# Patient Record
Sex: MALE | Race: WHITE | Employment: OTHER | URBAN - METROPOLITAN AREA
[De-identification: names, ages, dates, MRNs, and addresses within clinical notes are randomized per-mention and may not be internally consistent; named-entity substitution may affect disease eponyms.]

---

## 2018-06-26 ENCOUNTER — APPOINTMENT (OUTPATIENT)
Dept: GENERAL RADIOLOGY | Age: 83
End: 2018-06-26
Payer: MEDICARE

## 2018-06-26 ENCOUNTER — HOSPITAL ENCOUNTER (EMERGENCY)
Age: 83
Discharge: HOME | End: 2018-06-26
Attending: EMERGENCY MEDICINE
Payer: MEDICARE

## 2018-06-26 VITALS
TEMPERATURE: 97.8 F | BODY MASS INDEX: 29.35 KG/M2 | RESPIRATION RATE: 14 BRPM | HEIGHT: 70 IN | DIASTOLIC BLOOD PRESSURE: 84 MMHG | SYSTOLIC BLOOD PRESSURE: 150 MMHG | HEART RATE: 68 BPM | WEIGHT: 205 LBS | OXYGEN SATURATION: 96 %

## 2018-06-26 DIAGNOSIS — S42.001A CLOSED NONDISPLACED FRACTURE OF RIGHT CLAVICLE, UNSPECIFIED PART OF CLAVICLE, INITIAL ENCOUNTER: Primary | ICD-10-CM

## 2018-06-26 PROCEDURE — 73030 X-RAY EXAM OF SHOULDER: CPT

## 2018-06-26 PROCEDURE — 99284 EMERGENCY DEPT VISIT MOD MDM: CPT

## 2018-06-26 ASSESSMENT — PAIN DESCRIPTION - PAIN TYPE: TYPE: ACUTE PAIN

## 2018-06-26 ASSESSMENT — PAIN DESCRIPTION - DESCRIPTORS: DESCRIPTORS: ACHING

## 2018-06-26 ASSESSMENT — PAIN DESCRIPTION - LOCATION: LOCATION: SHOULDER

## 2018-06-26 ASSESSMENT — PAIN DESCRIPTION - FREQUENCY: FREQUENCY: CONTINUOUS

## 2018-06-26 ASSESSMENT — PAIN SCALES - GENERAL: PAINLEVEL_OUTOF10: 0

## 2018-06-26 ASSESSMENT — PAIN DESCRIPTION - ORIENTATION: ORIENTATION: RIGHT

## 2018-06-28 ENCOUNTER — OFFICE VISIT (OUTPATIENT)
Dept: ORTHOPEDIC SURGERY | Age: 83
End: 2018-06-28
Payer: MEDICARE

## 2018-06-28 VITALS
BODY MASS INDEX: 28.63 KG/M2 | HEIGHT: 70 IN | SYSTOLIC BLOOD PRESSURE: 134 MMHG | DIASTOLIC BLOOD PRESSURE: 81 MMHG | TEMPERATURE: 97 F | HEART RATE: 61 BPM | WEIGHT: 200 LBS

## 2018-06-28 DIAGNOSIS — S42.001A CLOSED NONDISPLACED FRACTURE OF RIGHT CLAVICLE, UNSPECIFIED PART OF CLAVICLE, INITIAL ENCOUNTER: Primary | ICD-10-CM

## 2018-06-28 PROCEDURE — 1036F TOBACCO NON-USER: CPT | Performed by: ORTHOPAEDIC SURGERY

## 2018-06-28 PROCEDURE — G8419 CALC BMI OUT NRM PARAM NOF/U: HCPCS | Performed by: ORTHOPAEDIC SURGERY

## 2018-06-28 PROCEDURE — 99203 OFFICE O/P NEW LOW 30 MIN: CPT | Performed by: ORTHOPAEDIC SURGERY

## 2018-06-28 PROCEDURE — 4040F PNEUMOC VAC/ADMIN/RCVD: CPT | Performed by: ORTHOPAEDIC SURGERY

## 2018-06-28 PROCEDURE — 23500 CLTX CLAVICULAR FX W/O MNPJ: CPT | Performed by: ORTHOPAEDIC SURGERY

## 2018-06-28 PROCEDURE — G8427 DOCREV CUR MEDS BY ELIG CLIN: HCPCS | Performed by: ORTHOPAEDIC SURGERY

## 2018-06-28 PROCEDURE — A4565 SLINGS: HCPCS | Performed by: ORTHOPAEDIC SURGERY

## 2018-06-28 PROCEDURE — G8598 ASA/ANTIPLAT THER USED: HCPCS | Performed by: ORTHOPAEDIC SURGERY

## 2018-06-28 PROCEDURE — 1123F ACP DISCUSS/DSCN MKR DOCD: CPT | Performed by: ORTHOPAEDIC SURGERY

## 2018-07-26 ENCOUNTER — OFFICE VISIT (OUTPATIENT)
Dept: ORTHOPEDIC SURGERY | Age: 83
End: 2018-07-26

## 2018-07-26 VITALS
WEIGHT: 200 LBS | TEMPERATURE: 97 F | HEIGHT: 70 IN | SYSTOLIC BLOOD PRESSURE: 136 MMHG | HEART RATE: 54 BPM | DIASTOLIC BLOOD PRESSURE: 81 MMHG | BODY MASS INDEX: 28.63 KG/M2

## 2018-07-26 DIAGNOSIS — S42.001D CLOSED NONDISPLACED FRACTURE OF RIGHT CLAVICLE WITH ROUTINE HEALING, UNSPECIFIED PART OF CLAVICLE, SUBSEQUENT ENCOUNTER: Primary | ICD-10-CM

## 2018-07-26 PROCEDURE — 99024 POSTOP FOLLOW-UP VISIT: CPT | Performed by: ORTHOPAEDIC SURGERY

## 2018-07-26 NOTE — PROGRESS NOTES
Smokeless tobacco: Never Used    Alcohol use No    Drug use: No    Sexual activity: Not Asked     Other Topics Concern    None     Social History Narrative    None       History reviewed. No pertinent family history. Review of Systems   No fever, chills, or other constitutional symptoms. No numbness or other neuro symptoms. Ortho Exam right shoulder exam demonstrates prominence of the right mid clavicle. Skin is intact without erythema. Anterior chest ecchymosis is resolving. Very mild tenderness over the right clavicular fracture site. Right shoulder flexion about 90°, abduction 90° gross strength is intact. Note that his left shoulder flexion is limited to about 110. Physical Exam    Patient is alert and oriented. Well-developed well-nourished. Pupils equal and reactive. Sclerae anicteric. Neck supple  Lungs clear. Cardiac rate and rhythm regular. Abdomen soft and nontender. Skin warm and dry. XRAY: deferred by patient request                    ASSESSMENT/PLAN:    Abbi Lozano was seen today for clavicle injury. Diagnoses and all orders for this visit:    Closed nondisplaced fracture of right clavicle with routine healing, unspecified part of clavicle, subsequent encounter     improving clinically 1 month after right clavicle fracture. Given his good progress and pain relief and his age, surgical treatment is not currently under consideration. Appropriate exercises were reviewed with the patient. He wishes to follow-up on an as-needed basis. Return if symptoms worsen or fail to improve.        Omar Eubanks MD    7/26/2018  10:16 AM

## 2018-10-04 ENCOUNTER — OFFICE VISIT (OUTPATIENT)
Dept: CARDIOLOGY CLINIC | Age: 83
End: 2018-10-04
Payer: MEDICARE

## 2018-10-04 VITALS
SYSTOLIC BLOOD PRESSURE: 110 MMHG | HEART RATE: 62 BPM | WEIGHT: 189 LBS | RESPIRATION RATE: 14 BRPM | BODY MASS INDEX: 27.99 KG/M2 | DIASTOLIC BLOOD PRESSURE: 70 MMHG | HEIGHT: 69 IN

## 2018-10-04 DIAGNOSIS — I24.9 ACS (ACUTE CORONARY SYNDROME) (HCC): Primary | ICD-10-CM

## 2018-10-04 PROCEDURE — 1101F PT FALLS ASSESS-DOCD LE1/YR: CPT | Performed by: INTERNAL MEDICINE

## 2018-10-04 PROCEDURE — 99213 OFFICE O/P EST LOW 20 MIN: CPT | Performed by: INTERNAL MEDICINE

## 2018-10-04 PROCEDURE — G8598 ASA/ANTIPLAT THER USED: HCPCS | Performed by: INTERNAL MEDICINE

## 2018-10-04 PROCEDURE — 1123F ACP DISCUSS/DSCN MKR DOCD: CPT | Performed by: INTERNAL MEDICINE

## 2018-10-04 PROCEDURE — G8484 FLU IMMUNIZE NO ADMIN: HCPCS | Performed by: INTERNAL MEDICINE

## 2018-10-04 PROCEDURE — G8419 CALC BMI OUT NRM PARAM NOF/U: HCPCS | Performed by: INTERNAL MEDICINE

## 2018-10-04 PROCEDURE — 93000 ELECTROCARDIOGRAM COMPLETE: CPT | Performed by: INTERNAL MEDICINE

## 2018-10-04 PROCEDURE — 1036F TOBACCO NON-USER: CPT | Performed by: INTERNAL MEDICINE

## 2018-10-04 PROCEDURE — G8427 DOCREV CUR MEDS BY ELIG CLIN: HCPCS | Performed by: INTERNAL MEDICINE

## 2018-10-04 PROCEDURE — 4040F PNEUMOC VAC/ADMIN/RCVD: CPT | Performed by: INTERNAL MEDICINE

## 2019-01-31 ENCOUNTER — APPOINTMENT (OUTPATIENT)
Dept: GENERAL RADIOLOGY | Age: 84
End: 2019-01-31
Payer: MEDICARE

## 2019-01-31 ENCOUNTER — HOSPITAL ENCOUNTER (EMERGENCY)
Age: 84
Discharge: HOME | End: 2019-01-31
Attending: EMERGENCY MEDICINE
Payer: MEDICARE

## 2019-01-31 VITALS
HEART RATE: 88 BPM | HEIGHT: 69 IN | DIASTOLIC BLOOD PRESSURE: 88 MMHG | WEIGHT: 198 LBS | SYSTOLIC BLOOD PRESSURE: 150 MMHG | OXYGEN SATURATION: 95 % | TEMPERATURE: 98.3 F | BODY MASS INDEX: 29.33 KG/M2 | RESPIRATION RATE: 16 BRPM

## 2019-01-31 DIAGNOSIS — R04.2 SPITTING UP BLOOD: Primary | ICD-10-CM

## 2019-01-31 LAB
ALBUMIN SERPL-MCNC: 4.6 G/DL (ref 3.5–5.2)
ALP BLD-CCNC: 66 U/L (ref 40–129)
ALT SERPL-CCNC: 21 U/L (ref 0–40)
ANION GAP SERPL CALCULATED.3IONS-SCNC: 14 MMOL/L (ref 7–16)
AST SERPL-CCNC: 30 U/L (ref 0–39)
BASOPHILS ABSOLUTE: 0.07 E9/L (ref 0–0.2)
BASOPHILS RELATIVE PERCENT: 1.3 % (ref 0–2)
BILIRUB SERPL-MCNC: 0.4 MG/DL (ref 0–1.2)
BUN BLDV-MCNC: 28 MG/DL (ref 8–23)
CALCIUM SERPL-MCNC: 10.3 MG/DL (ref 8.6–10.2)
CHLORIDE BLD-SCNC: 102 MMOL/L (ref 98–107)
CO2: 24 MMOL/L (ref 22–29)
CREAT SERPL-MCNC: 1.7 MG/DL (ref 0.7–1.2)
EOSINOPHILS ABSOLUTE: 0.2 E9/L (ref 0.05–0.5)
EOSINOPHILS RELATIVE PERCENT: 3.6 % (ref 0–6)
GFR AFRICAN AMERICAN: 47
GFR NON-AFRICAN AMERICAN: 39 ML/MIN/1.73
GLUCOSE BLD-MCNC: 212 MG/DL (ref 74–99)
HCT VFR BLD CALC: 38.1 % (ref 37–54)
HEMOGLOBIN: 12.7 G/DL (ref 12.5–16.5)
IMMATURE GRANULOCYTES #: 0.02 E9/L
IMMATURE GRANULOCYTES %: 0.4 % (ref 0–5)
LYMPHOCYTES ABSOLUTE: 1.8 E9/L (ref 1.5–4)
LYMPHOCYTES RELATIVE PERCENT: 32.5 % (ref 20–42)
MCH RBC QN AUTO: 32.8 PG (ref 26–35)
MCHC RBC AUTO-ENTMCNC: 33.3 % (ref 32–34.5)
MCV RBC AUTO: 98.4 FL (ref 80–99.9)
MONOCYTES ABSOLUTE: 0.54 E9/L (ref 0.1–0.95)
MONOCYTES RELATIVE PERCENT: 9.8 % (ref 2–12)
NEUTROPHILS ABSOLUTE: 2.9 E9/L (ref 1.8–7.3)
NEUTROPHILS RELATIVE PERCENT: 52.4 % (ref 43–80)
PDW BLD-RTO: 13.3 FL (ref 11.5–15)
PLATELET # BLD: 237 E9/L (ref 130–450)
PMV BLD AUTO: 11 FL (ref 7–12)
POTASSIUM SERPL-SCNC: 4.7 MMOL/L (ref 3.5–5)
RBC # BLD: 3.87 E12/L (ref 3.8–5.8)
SODIUM BLD-SCNC: 140 MMOL/L (ref 132–146)
TOTAL PROTEIN: 8 G/DL (ref 6.4–8.3)
WBC # BLD: 5.5 E9/L (ref 4.5–11.5)

## 2019-01-31 PROCEDURE — 85025 COMPLETE CBC W/AUTO DIFF WBC: CPT

## 2019-01-31 PROCEDURE — 80053 COMPREHEN METABOLIC PANEL: CPT

## 2019-01-31 PROCEDURE — 99283 EMERGENCY DEPT VISIT LOW MDM: CPT

## 2019-01-31 PROCEDURE — 36415 COLL VENOUS BLD VENIPUNCTURE: CPT

## 2019-01-31 PROCEDURE — 71046 X-RAY EXAM CHEST 2 VIEWS: CPT

## 2019-10-08 ENCOUNTER — OFFICE VISIT (OUTPATIENT)
Dept: CARDIOLOGY CLINIC | Age: 84
End: 2019-10-08
Payer: MEDICARE

## 2019-10-08 VITALS
BODY MASS INDEX: 28.71 KG/M2 | HEART RATE: 56 BPM | HEIGHT: 69 IN | WEIGHT: 193.8 LBS | SYSTOLIC BLOOD PRESSURE: 112 MMHG | DIASTOLIC BLOOD PRESSURE: 70 MMHG | RESPIRATION RATE: 18 BRPM

## 2019-10-08 DIAGNOSIS — I24.9 ACS (ACUTE CORONARY SYNDROME) (HCC): Primary | ICD-10-CM

## 2019-10-08 PROCEDURE — G8598 ASA/ANTIPLAT THER USED: HCPCS | Performed by: INTERNAL MEDICINE

## 2019-10-08 PROCEDURE — 4040F PNEUMOC VAC/ADMIN/RCVD: CPT | Performed by: INTERNAL MEDICINE

## 2019-10-08 PROCEDURE — G8484 FLU IMMUNIZE NO ADMIN: HCPCS | Performed by: INTERNAL MEDICINE

## 2019-10-08 PROCEDURE — G8427 DOCREV CUR MEDS BY ELIG CLIN: HCPCS | Performed by: INTERNAL MEDICINE

## 2019-10-08 PROCEDURE — 1123F ACP DISCUSS/DSCN MKR DOCD: CPT | Performed by: INTERNAL MEDICINE

## 2019-10-08 PROCEDURE — 99213 OFFICE O/P EST LOW 20 MIN: CPT | Performed by: INTERNAL MEDICINE

## 2019-10-08 PROCEDURE — 1036F TOBACCO NON-USER: CPT | Performed by: INTERNAL MEDICINE

## 2019-10-08 PROCEDURE — 93000 ELECTROCARDIOGRAM COMPLETE: CPT | Performed by: INTERNAL MEDICINE

## 2019-10-08 PROCEDURE — G8419 CALC BMI OUT NRM PARAM NOF/U: HCPCS | Performed by: INTERNAL MEDICINE

## 2020-12-15 LAB — SARS-COV-2: DETECTED

## 2020-12-16 ENCOUNTER — APPOINTMENT (OUTPATIENT)
Dept: GENERAL RADIOLOGY | Age: 85
DRG: 177 | End: 2020-12-16
Payer: MEDICARE

## 2020-12-16 ENCOUNTER — HOSPITAL ENCOUNTER (EMERGENCY)
Age: 85
Discharge: HOME | DRG: 177 | End: 2020-12-16
Attending: EMERGENCY MEDICINE
Payer: MEDICARE

## 2020-12-16 VITALS
RESPIRATION RATE: 18 BRPM | OXYGEN SATURATION: 98 % | DIASTOLIC BLOOD PRESSURE: 67 MMHG | SYSTOLIC BLOOD PRESSURE: 113 MMHG | WEIGHT: 190 LBS | TEMPERATURE: 98.6 F | BODY MASS INDEX: 27.2 KG/M2 | HEART RATE: 75 BPM | HEIGHT: 70 IN

## 2020-12-16 LAB
ALBUMIN SERPL-MCNC: 3.7 G/DL (ref 3.5–5.2)
ALP BLD-CCNC: 46 U/L (ref 40–129)
ALT SERPL-CCNC: 35 U/L (ref 0–40)
ANION GAP SERPL CALCULATED.3IONS-SCNC: 15 MMOL/L (ref 7–16)
AST SERPL-CCNC: 86 U/L (ref 0–39)
BASOPHILS ABSOLUTE: 0.01 E9/L (ref 0–0.2)
BASOPHILS RELATIVE PERCENT: 0.3 % (ref 0–2)
BILIRUB SERPL-MCNC: 0.8 MG/DL (ref 0–1.2)
BUN BLDV-MCNC: 27 MG/DL (ref 8–23)
CALCIUM SERPL-MCNC: 9.7 MG/DL (ref 8.6–10.2)
CHLORIDE BLD-SCNC: 97 MMOL/L (ref 98–107)
CO2: 20 MMOL/L (ref 22–29)
CREAT SERPL-MCNC: 1.8 MG/DL (ref 0.7–1.2)
EKG ATRIAL RATE: 72 BPM
EKG P AXIS: 10 DEGREES
EKG P-R INTERVAL: 160 MS
EKG Q-T INTERVAL: 416 MS
EKG QRS DURATION: 114 MS
EKG QTC CALCULATION (BAZETT): 455 MS
EKG R AXIS: -50 DEGREES
EKG T AXIS: 58 DEGREES
EKG VENTRICULAR RATE: 72 BPM
EOSINOPHILS ABSOLUTE: 0.03 E9/L (ref 0.05–0.5)
EOSINOPHILS RELATIVE PERCENT: 0.8 % (ref 0–6)
GFR AFRICAN AMERICAN: 44
GFR NON-AFRICAN AMERICAN: 36 ML/MIN/1.73
GLUCOSE BLD-MCNC: 102 MG/DL (ref 74–99)
HCT VFR BLD CALC: 35.5 % (ref 37–54)
HEMOGLOBIN: 12.4 G/DL (ref 12.5–16.5)
IMMATURE GRANULOCYTES #: 0.04 E9/L
IMMATURE GRANULOCYTES %: 1.1 % (ref 0–5)
LYMPHOCYTES ABSOLUTE: 0.91 E9/L (ref 1.5–4)
LYMPHOCYTES RELATIVE PERCENT: 25.5 % (ref 20–42)
MCH RBC QN AUTO: 33.8 PG (ref 26–35)
MCHC RBC AUTO-ENTMCNC: 34.9 % (ref 32–34.5)
MCV RBC AUTO: 96.7 FL (ref 80–99.9)
MONOCYTES ABSOLUTE: 0.44 E9/L (ref 0.1–0.95)
MONOCYTES RELATIVE PERCENT: 12.3 % (ref 2–12)
NEUTROPHILS ABSOLUTE: 2.14 E9/L (ref 1.8–7.3)
NEUTROPHILS RELATIVE PERCENT: 60 % (ref 43–80)
PDW BLD-RTO: 13 FL (ref 11.5–15)
PLATELET # BLD: 194 E9/L (ref 130–450)
PMV BLD AUTO: 10.3 FL (ref 7–12)
POTASSIUM SERPL-SCNC: 4.6 MMOL/L (ref 3.5–5)
PRO-BNP: 474 PG/ML (ref 0–450)
RBC # BLD: 3.67 E12/L (ref 3.8–5.8)
SODIUM BLD-SCNC: 132 MMOL/L (ref 132–146)
TOTAL PROTEIN: 7.7 G/DL (ref 6.4–8.3)
TROPONIN: 0.01 NG/ML (ref 0–0.03)
WBC # BLD: 3.6 E9/L (ref 4.5–11.5)

## 2020-12-16 PROCEDURE — 84484 ASSAY OF TROPONIN QUANT: CPT

## 2020-12-16 PROCEDURE — 85025 COMPLETE CBC W/AUTO DIFF WBC: CPT

## 2020-12-16 PROCEDURE — 80053 COMPREHEN METABOLIC PANEL: CPT

## 2020-12-16 PROCEDURE — 99284 EMERGENCY DEPT VISIT MOD MDM: CPT

## 2020-12-16 PROCEDURE — 36415 COLL VENOUS BLD VENIPUNCTURE: CPT

## 2020-12-16 PROCEDURE — 93005 ELECTROCARDIOGRAM TRACING: CPT | Performed by: EMERGENCY MEDICINE

## 2020-12-16 PROCEDURE — 83880 ASSAY OF NATRIURETIC PEPTIDE: CPT

## 2020-12-16 PROCEDURE — 71045 X-RAY EXAM CHEST 1 VIEW: CPT

## 2020-12-16 PROCEDURE — 93010 ELECTROCARDIOGRAM REPORT: CPT | Performed by: INTERNAL MEDICINE

## 2020-12-16 RX ORDER — AZITHROMYCIN 250 MG/1
TABLET, FILM COATED ORAL
Qty: 1 PACKET | Refills: 0 | Status: SHIPPED | OUTPATIENT
Start: 2020-12-16 | End: 2020-12-19

## 2020-12-16 RX ORDER — AMOXICILLIN AND CLAVULANATE POTASSIUM 875; 125 MG/1; MG/1
1 TABLET, FILM COATED ORAL 2 TIMES DAILY
Qty: 14 TABLET | Refills: 0 | Status: SHIPPED | OUTPATIENT
Start: 2020-12-16 | End: 2020-12-19

## 2020-12-16 ASSESSMENT — ENCOUNTER SYMPTOMS
EYE DISCHARGE: 0
EYE PAIN: 0
VOMITING: 0
BACK PAIN: 0
ABDOMINAL PAIN: 0
EYE REDNESS: 0
WHEEZING: 0
SINUS PRESSURE: 0
NAUSEA: 0
COUGH: 1
SORE THROAT: 0
DIARRHEA: 1
SHORTNESS OF BREATH: 1

## 2020-12-16 NOTE — ED NOTES
Patient ambulated with pulse ox. Patients pulse ox 92-94% RA.  Dr Lizabeth Cooks notified     Javier Patel RN  12/16/20 2629

## 2020-12-16 NOTE — ED PROVIDER NOTES
Hypertension, Sleep apnea, and Type II or unspecified type diabetes mellitus without mention of complication, not stated as uncontrolled. Past Surgical History:  has a past surgical history that includes Coronary artery bypass graft (1998); Total knee arthroplasty (2000); and Cholecystectomy. Social History:  reports that he quit smoking about 57 years ago. His smoking use included cigarettes. He has a 10.00 pack-year smoking history. He has never used smokeless tobacco. He reports that he does not drink alcohol or use drugs. Family History: family history is not on file. The patients home medications have been reviewed. Allergies: Bee venom, Shellfish-derived products, and Sulfa antibiotics        ------------------------- NURSING NOTES AND VITALS REVIEWED ---------------------------   The nursing notes within the ED encounter and vital signs as below have been reviewed. /67   Pulse 75   Temp 98.6 °F (37 °C) (Oral)   Resp 18   Ht 5' 10\" (1.778 m)   Wt 190 lb (86.2 kg)   SpO2 98%   BMI 27.26 kg/m²   Oxygen Saturation Interpretation: Normal      ---------------------------------------------------PHYSICAL EXAM--------------------------------------    Physical Exam  Vitals signs and nursing note reviewed. Constitutional:       Appearance: He is well-developed. HENT:      Head: Normocephalic and atraumatic. Eyes:      Conjunctiva/sclera: Conjunctivae normal.   Neck:      Musculoskeletal: Normal range of motion and neck supple. Cardiovascular:      Rate and Rhythm: Normal rate and regular rhythm. Pulses: Normal pulses. Heart sounds: Normal heart sounds. No murmur. Comments: Old well-healed median sternotomy scar  Pulmonary:      Effort: Pulmonary effort is normal. No respiratory distress. Breath sounds: Normal breath sounds. No wheezing or rales. Abdominal:      General: Bowel sounds are normal.      Palpations: Abdomen is soft. Tenderness:  There is no abdominal tenderness. There is no guarding or rebound. Musculoskeletal:         General: No tenderness or deformity. Skin:     General: Skin is warm and dry. Capillary Refill: Capillary refill takes less than 2 seconds. Neurological:      Mental Status: He is alert and oriented to person, place, and time. Cranial Nerves: No cranial nerve deficit.       Coordination: Coordination normal.            -------------------------------------------------- RESULTS -------------------------------------------------  All laboratory and radiology results have been personally reviewed by myself   LABS:  Results for orders placed or performed during the hospital encounter of 12/16/20   CBC Auto Differential   Result Value Ref Range    WBC 3.6 (L) 4.5 - 11.5 E9/L    RBC 3.67 (L) 3.80 - 5.80 E12/L    Hemoglobin 12.4 (L) 12.5 - 16.5 g/dL    Hematocrit 35.5 (L) 37.0 - 54.0 %    MCV 96.7 80.0 - 99.9 fL    MCH 33.8 26.0 - 35.0 pg    MCHC 34.9 (H) 32.0 - 34.5 %    RDW 13.0 11.5 - 15.0 fL    Platelets 969 071 - 080 E9/L    MPV 10.3 7.0 - 12.0 fL    Neutrophils % 60.0 43.0 - 80.0 %    Immature Granulocytes % 1.1 0.0 - 5.0 %    Lymphocytes % 25.5 20.0 - 42.0 %    Monocytes % 12.3 (H) 2.0 - 12.0 %    Eosinophils % 0.8 0.0 - 6.0 %    Basophils % 0.3 0.0 - 2.0 %    Neutrophils Absolute 2.14 1.80 - 7.30 E9/L    Immature Granulocytes # 0.04 E9/L    Lymphocytes Absolute 0.91 (L) 1.50 - 4.00 E9/L    Monocytes Absolute 0.44 0.10 - 0.95 E9/L    Eosinophils Absolute 0.03 (L) 0.05 - 0.50 E9/L    Basophils Absolute 0.01 0.00 - 0.20 E9/L   Comprehensive Metabolic Panel   Result Value Ref Range    Sodium 132 132 - 146 mmol/L    Potassium 4.6 3.5 - 5.0 mmol/L    Chloride 97 (L) 98 - 107 mmol/L    CO2 20 (L) 22 - 29 mmol/L    Anion Gap 15 7 - 16 mmol/L    Glucose 102 (H) 74 - 99 mg/dL    BUN 27 (H) 8 - 23 mg/dL    CREATININE 1.8 (H) 0.7 - 1.2 mg/dL    GFR Non-African American 36 >=60 mL/min/1.73    GFR African American 44     Calcium 9.7 8.6 - 10.2 mg/dL    Total Protein 7.7 6.4 - 8.3 g/dL    Alb 3.7 3.5 - 5.2 g/dL    Total Bilirubin 0.8 0.0 - 1.2 mg/dL    Alkaline Phosphatase 46 40 - 129 U/L    ALT 35 0 - 40 U/L    AST 86 (H) 0 - 39 U/L   Troponin   Result Value Ref Range    Troponin 0.01 0.00 - 0.03 ng/mL   Brain Natriuretic Peptide   Result Value Ref Range    Pro- (H) 0 - 450 pg/mL   EKG 12 Lead   Result Value Ref Range    Ventricular Rate 72 BPM    Atrial Rate 72 BPM    P-R Interval 160 ms    QRS Duration 114 ms    Q-T Interval 416 ms    QTc Calculation (Bazett) 455 ms    P Axis 10 degrees    R Axis -50 degrees    T Axis 58 degrees       RADIOLOGY:  Interpreted by Radiologist.  XR CHEST PORTABLE   Final Result   1. Right perihilar and right lower lobe infiltrate          EKG: This EKG is signed by emergency department physician. Rate: 72  Rhythm: Sinus  Interpretation: Anterior fascicular block, left ventricular hypertrophy   Comparison: stable as compared to patient's most recent EKG 10/8/19      ------------------------------ ED COURSE/MEDICAL DECISION MAKING----------------------  Medications - No data to display    ED Course as of Dec 21 1049   Wed Dec 16, 2020   1203 Compared to 1.7 1 year ago. Creatinine(!): 1.8 [MF]   1237 Discussed results and plan with patient he is agreeable. All questions answered    [MF]      ED Course User Index  [MF] Olvin Calix DO          Medical Decision Makin-year-old male presents for worsening fatigue with shortness of breath and diarrhea. Outpatient Covid test is pending. X-ray does show infiltrates will treat for possible bacterial pneumonia patient struck to quarantine pending outpatient Covid testing given strict return precautions as he is not requiring oxygen at this time. Counseling:    The emergency provider has spoken with the patient and discussed todays results, in addition to providing specific details for the plan of care and counseling regarding the diagnosis and prognosis. Questions are answered at this time and they are agreeable with the plan.      --------------------------------- IMPRESSION AND DISPOSITION ---------------------------------    IMPRESSION  1. Pneumonia due to organism        DISPOSITION  Disposition: Discharge to home  Patient condition is stable      IDr. Lisbet, am the primary doctor of record. NOTE: This report was transcribed using voice recognition software.  Effort was made to ensure accuracy; however, inadvertent computerized transcription errors may be present         Lisbet Smith DO  12/21/20 1049

## 2020-12-16 NOTE — ED NOTES
Patient given discharge paperwork at this time. Patient also given scripts. Patient has no further questions at this time. Nurse provided education to patient. Patient is alert and oriented and VS are stable at this time.       Gustabo Mohs, RN  12/16/20 6252

## 2020-12-19 ENCOUNTER — HOSPITAL ENCOUNTER (INPATIENT)
Age: 85
LOS: 3 days | Discharge: HOME | DRG: 177 | End: 2020-12-22
Attending: EMERGENCY MEDICINE | Admitting: INTERNAL MEDICINE
Payer: MEDICARE

## 2020-12-19 ENCOUNTER — APPOINTMENT (OUTPATIENT)
Dept: CT IMAGING | Age: 85
DRG: 177 | End: 2020-12-19
Payer: MEDICARE

## 2020-12-19 PROBLEM — D64.9 ANEMIA: Status: ACTIVE | Noted: 2020-12-19

## 2020-12-19 PROBLEM — K21.9 GASTROESOPHAGEAL REFLUX DISEASE WITHOUT ESOPHAGITIS: Status: ACTIVE | Noted: 2020-12-19

## 2020-12-19 PROBLEM — U07.1 COVID-19 VIRUS INFECTION: Status: ACTIVE | Noted: 2020-12-19

## 2020-12-19 PROBLEM — U07.1 COVID-19: Status: ACTIVE | Noted: 2020-12-19

## 2020-12-19 PROBLEM — R79.89 ELEVATED SERUM CREATININE: Status: ACTIVE | Noted: 2020-12-19

## 2020-12-19 PROBLEM — E87.1 HYPONATREMIA: Status: ACTIVE | Noted: 2020-12-19

## 2020-12-19 PROBLEM — J96.01 ACUTE RESPIRATORY FAILURE WITH HYPOXIA (HCC): Status: ACTIVE | Noted: 2020-12-19

## 2020-12-19 PROBLEM — E11.9 TYPE 2 DIABETES MELLITUS, WITHOUT LONG-TERM CURRENT USE OF INSULIN (HCC): Status: ACTIVE | Noted: 2020-12-19

## 2020-12-19 PROBLEM — I25.10 CAD (CORONARY ARTERY DISEASE): Status: ACTIVE | Noted: 2020-12-19

## 2020-12-19 PROBLEM — G47.33 OSA ON CPAP: Status: ACTIVE | Noted: 2020-12-19

## 2020-12-19 PROBLEM — Z99.89 OSA ON CPAP: Status: ACTIVE | Noted: 2020-12-19

## 2020-12-19 LAB
ANION GAP SERPL CALCULATED.3IONS-SCNC: 14 MMOL/L (ref 7–16)
BASOPHILS ABSOLUTE: 0 E9/L (ref 0–0.2)
BASOPHILS RELATIVE PERCENT: 0.3 % (ref 0–2)
BUN BLDV-MCNC: 24 MG/DL (ref 8–23)
C-REACTIVE PROTEIN: 9.7 MG/DL (ref 0–0.4)
CALCIUM SERPL-MCNC: 9.3 MG/DL (ref 8.6–10.2)
CHLORIDE BLD-SCNC: 96 MMOL/L (ref 98–107)
CO2: 19 MMOL/L (ref 22–29)
CREAT SERPL-MCNC: 1.5 MG/DL (ref 0.7–1.2)
EOSINOPHILS ABSOLUTE: 0 E9/L (ref 0.05–0.5)
EOSINOPHILS RELATIVE PERCENT: 0 % (ref 0–6)
GFR AFRICAN AMERICAN: 54
GFR NON-AFRICAN AMERICAN: 45 ML/MIN/1.73
GLUCOSE BLD-MCNC: 119 MG/DL (ref 74–99)
HCT VFR BLD CALC: 31.7 % (ref 37–54)
HEMOGLOBIN: 11.2 G/DL (ref 12.5–16.5)
HYPOCHROMIA: ABNORMAL
LACTATE DEHYDROGENASE: 381 U/L (ref 135–225)
LACTIC ACID, SEPSIS: 1.3 MMOL/L (ref 0.5–1.9)
LYMPHOCYTES ABSOLUTE: 0.4 E9/L (ref 1.5–4)
LYMPHOCYTES RELATIVE PERCENT: 11.3 % (ref 20–42)
MCH RBC QN AUTO: 32.5 PG (ref 26–35)
MCHC RBC AUTO-ENTMCNC: 35.3 % (ref 32–34.5)
MCV RBC AUTO: 91.9 FL (ref 80–99.9)
MONOCYTES ABSOLUTE: 0.29 E9/L (ref 0.1–0.95)
MONOCYTES RELATIVE PERCENT: 7.8 % (ref 2–12)
NEUTROPHILS ABSOLUTE: 2.92 E9/L (ref 1.8–7.3)
NEUTROPHILS RELATIVE PERCENT: 80.9 % (ref 43–80)
PDW BLD-RTO: 13.1 FL (ref 11.5–15)
PLATELET # BLD: 262 E9/L (ref 130–450)
PMV BLD AUTO: 9.9 FL (ref 7–12)
POLYCHROMASIA: ABNORMAL
POTASSIUM REFLEX MAGNESIUM: 4.2 MMOL/L (ref 3.5–5)
PRO-BNP: 662 PG/ML (ref 0–450)
RBC # BLD: 3.45 E12/L (ref 3.8–5.8)
SODIUM BLD-SCNC: 129 MMOL/L (ref 132–146)
TROPONIN: <0.01 NG/ML (ref 0–0.03)
WBC # BLD: 3.6 E9/L (ref 4.5–11.5)

## 2020-12-19 PROCEDURE — 6370000000 HC RX 637 (ALT 250 FOR IP): Performed by: EMERGENCY MEDICINE

## 2020-12-19 PROCEDURE — 6360000004 HC RX CONTRAST MEDICATION: Performed by: RADIOLOGY

## 2020-12-19 PROCEDURE — 71275 CT ANGIOGRAPHY CHEST: CPT

## 2020-12-19 PROCEDURE — 2060000000 HC ICU INTERMEDIATE R&B

## 2020-12-19 PROCEDURE — 84484 ASSAY OF TROPONIN QUANT: CPT

## 2020-12-19 PROCEDURE — 86140 C-REACTIVE PROTEIN: CPT

## 2020-12-19 PROCEDURE — 80048 BASIC METABOLIC PNL TOTAL CA: CPT

## 2020-12-19 PROCEDURE — 96375 TX/PRO/DX INJ NEW DRUG ADDON: CPT

## 2020-12-19 PROCEDURE — 96361 HYDRATE IV INFUSION ADD-ON: CPT

## 2020-12-19 PROCEDURE — 96374 THER/PROPH/DIAG INJ IV PUSH: CPT

## 2020-12-19 PROCEDURE — 83605 ASSAY OF LACTIC ACID: CPT

## 2020-12-19 PROCEDURE — 2580000003 HC RX 258: Performed by: EMERGENCY MEDICINE

## 2020-12-19 PROCEDURE — 93005 ELECTROCARDIOGRAM TRACING: CPT | Performed by: EMERGENCY MEDICINE

## 2020-12-19 PROCEDURE — 99285 EMERGENCY DEPT VISIT HI MDM: CPT

## 2020-12-19 PROCEDURE — 83880 ASSAY OF NATRIURETIC PEPTIDE: CPT

## 2020-12-19 PROCEDURE — 87150 DNA/RNA AMPLIFIED PROBE: CPT

## 2020-12-19 PROCEDURE — 6360000002 HC RX W HCPCS: Performed by: EMERGENCY MEDICINE

## 2020-12-19 PROCEDURE — 85025 COMPLETE CBC W/AUTO DIFF WBC: CPT

## 2020-12-19 PROCEDURE — 87040 BLOOD CULTURE FOR BACTERIA: CPT

## 2020-12-19 PROCEDURE — 83615 LACTATE (LD) (LDH) ENZYME: CPT

## 2020-12-19 PROCEDURE — XW033E5 INTRODUCTION OF REMDESIVIR ANTI-INFECTIVE INTO PERIPHERAL VEIN, PERCUTANEOUS APPROACH, NEW TECHNOLOGY GROUP 5: ICD-10-PCS | Performed by: INTERNAL MEDICINE

## 2020-12-19 RX ORDER — DEXAMETHASONE SODIUM PHOSPHATE 10 MG/ML
6 INJECTION INTRAMUSCULAR; INTRAVENOUS DAILY
Status: DISCONTINUED | OUTPATIENT
Start: 2020-12-20 | End: 2020-12-22 | Stop reason: HOSPADM

## 2020-12-19 RX ORDER — FENOFIBRATE 160 MG/1
160 TABLET ORAL DAILY
Status: DISCONTINUED | OUTPATIENT
Start: 2020-12-20 | End: 2020-12-22 | Stop reason: HOSPADM

## 2020-12-19 RX ORDER — DIPHENHYDRAMINE HYDROCHLORIDE 50 MG/ML
25 INJECTION INTRAMUSCULAR; INTRAVENOUS ONCE
Status: COMPLETED | OUTPATIENT
Start: 2020-12-19 | End: 2020-12-19

## 2020-12-19 RX ORDER — ACETAMINOPHEN 325 MG/1
650 TABLET ORAL EVERY 6 HOURS PRN
Status: DISCONTINUED | OUTPATIENT
Start: 2020-12-19 | End: 2020-12-22 | Stop reason: HOSPADM

## 2020-12-19 RX ORDER — ASPIRIN 325 MG
325 TABLET ORAL DAILY
Status: DISCONTINUED | OUTPATIENT
Start: 2020-12-20 | End: 2020-12-22 | Stop reason: HOSPADM

## 2020-12-19 RX ORDER — SODIUM CHLORIDE 0.9 % (FLUSH) 0.9 %
10 SYRINGE (ML) INJECTION EVERY 12 HOURS SCHEDULED
Status: DISCONTINUED | OUTPATIENT
Start: 2020-12-19 | End: 2020-12-22 | Stop reason: HOSPADM

## 2020-12-19 RX ORDER — MULTIVITAMIN WITH IRON
1 TABLET ORAL DAILY
Status: DISCONTINUED | OUTPATIENT
Start: 2020-12-20 | End: 2020-12-22 | Stop reason: HOSPADM

## 2020-12-19 RX ORDER — ATORVASTATIN CALCIUM 20 MG/1
20 TABLET, FILM COATED ORAL NIGHTLY
Status: DISCONTINUED | OUTPATIENT
Start: 2020-12-19 | End: 2020-12-22 | Stop reason: HOSPADM

## 2020-12-19 RX ORDER — DEXTROSE MONOHYDRATE 25 G/50ML
12.5 INJECTION, SOLUTION INTRAVENOUS PRN
Status: DISCONTINUED | OUTPATIENT
Start: 2020-12-19 | End: 2020-12-22 | Stop reason: HOSPADM

## 2020-12-19 RX ORDER — ACETAMINOPHEN 500 MG
1000 TABLET ORAL ONCE
Status: COMPLETED | OUTPATIENT
Start: 2020-12-19 | End: 2020-12-19

## 2020-12-19 RX ORDER — METOPROLOL TARTRATE 50 MG/1
50 TABLET, FILM COATED ORAL DAILY
Status: DISCONTINUED | OUTPATIENT
Start: 2020-12-20 | End: 2020-12-22 | Stop reason: HOSPADM

## 2020-12-19 RX ORDER — METOPROLOL TARTRATE 50 MG/1
50 TABLET, FILM COATED ORAL DAILY
COMMUNITY
End: 2021-10-12 | Stop reason: SDUPTHER

## 2020-12-19 RX ORDER — ACETAMINOPHEN 650 MG/1
650 SUPPOSITORY RECTAL EVERY 6 HOURS PRN
Status: DISCONTINUED | OUTPATIENT
Start: 2020-12-19 | End: 2020-12-22 | Stop reason: HOSPADM

## 2020-12-19 RX ORDER — ZINC SULFATE 50(220)MG
50 CAPSULE ORAL DAILY
Status: DISCONTINUED | OUTPATIENT
Start: 2020-12-20 | End: 2020-12-22 | Stop reason: HOSPADM

## 2020-12-19 RX ORDER — PANTOPRAZOLE SODIUM 40 MG/1
40 TABLET, DELAYED RELEASE ORAL
Status: DISCONTINUED | OUTPATIENT
Start: 2020-12-20 | End: 2020-12-22 | Stop reason: HOSPADM

## 2020-12-19 RX ORDER — SODIUM CHLORIDE 0.9 % (FLUSH) 0.9 %
10 SYRINGE (ML) INJECTION PRN
Status: DISCONTINUED | OUTPATIENT
Start: 2020-12-19 | End: 2020-12-22 | Stop reason: HOSPADM

## 2020-12-19 RX ORDER — NICOTINE POLACRILEX 4 MG
15 LOZENGE BUCCAL PRN
Status: DISCONTINUED | OUTPATIENT
Start: 2020-12-19 | End: 2020-12-22 | Stop reason: HOSPADM

## 2020-12-19 RX ORDER — POLYETHYLENE GLYCOL 3350 17 G/17G
17 POWDER, FOR SOLUTION ORAL DAILY PRN
Status: DISCONTINUED | OUTPATIENT
Start: 2020-12-19 | End: 2020-12-22 | Stop reason: HOSPADM

## 2020-12-19 RX ORDER — ALBUTEROL SULFATE 90 UG/1
2 AEROSOL, METERED RESPIRATORY (INHALATION) EVERY 6 HOURS
Status: DISCONTINUED | OUTPATIENT
Start: 2020-12-19 | End: 2020-12-22 | Stop reason: HOSPADM

## 2020-12-19 RX ORDER — FENOFIBRATE 160 MG/1
1 TABLET ORAL DAILY
Status: DISCONTINUED | OUTPATIENT
Start: 2020-12-20 | End: 2020-12-19 | Stop reason: CLARIF

## 2020-12-19 RX ORDER — DEXTROSE MONOHYDRATE 50 MG/ML
100 INJECTION, SOLUTION INTRAVENOUS PRN
Status: DISCONTINUED | OUTPATIENT
Start: 2020-12-19 | End: 2020-12-22 | Stop reason: HOSPADM

## 2020-12-19 RX ORDER — ASCORBIC ACID 500 MG
500 TABLET ORAL DAILY
Status: DISCONTINUED | OUTPATIENT
Start: 2020-12-20 | End: 2020-12-22 | Stop reason: HOSPADM

## 2020-12-19 RX ORDER — OMEPRAZOLE 20 MG/1
40 CAPSULE, DELAYED RELEASE ORAL DAILY
Status: DISCONTINUED | OUTPATIENT
Start: 2020-12-20 | End: 2020-12-19 | Stop reason: CLARIF

## 2020-12-19 RX ORDER — OMEPRAZOLE 20 MG/1
40 CAPSULE, DELAYED RELEASE ORAL DAILY
COMMUNITY
End: 2021-10-12

## 2020-12-19 RX ORDER — BENZONATATE 100 MG/1
100 CAPSULE ORAL EVERY 8 HOURS PRN
Status: DISCONTINUED | OUTPATIENT
Start: 2020-12-19 | End: 2020-12-22 | Stop reason: HOSPADM

## 2020-12-19 RX ORDER — 0.9 % SODIUM CHLORIDE 0.9 %
1000 INTRAVENOUS SOLUTION INTRAVENOUS ONCE
Status: COMPLETED | OUTPATIENT
Start: 2020-12-19 | End: 2020-12-19

## 2020-12-19 RX ORDER — DIPHENHYDRAMINE HCL 25 MG
50 TABLET ORAL ONCE
Status: DISCONTINUED | OUTPATIENT
Start: 2020-12-19 | End: 2020-12-19

## 2020-12-19 RX ORDER — 0.9 % SODIUM CHLORIDE 0.9 %
30 INTRAVENOUS SOLUTION INTRAVENOUS PRN
Status: DISCONTINUED | OUTPATIENT
Start: 2020-12-19 | End: 2020-12-22 | Stop reason: HOSPADM

## 2020-12-19 RX ORDER — CHOLECALCIFEROL (VITAMIN D3) 50 MCG
5000 TABLET ORAL DAILY
Status: DISCONTINUED | OUTPATIENT
Start: 2020-12-20 | End: 2020-12-22 | Stop reason: HOSPADM

## 2020-12-19 RX ADMIN — DIPHENHYDRAMINE HYDROCHLORIDE 25 MG: 50 INJECTION, SOLUTION INTRAMUSCULAR; INTRAVENOUS at 19:34

## 2020-12-19 RX ADMIN — IOPAMIDOL 75 ML: 755 INJECTION, SOLUTION INTRAVENOUS at 20:52

## 2020-12-19 RX ADMIN — SODIUM CHLORIDE 1000 ML: 9 INJECTION, SOLUTION INTRAVENOUS at 19:34

## 2020-12-19 RX ADMIN — ACETAMINOPHEN 1000 MG: 500 TABLET ORAL at 19:33

## 2020-12-19 RX ADMIN — HYDROCORTISONE SODIUM SUCCINATE 100 MG: 100 INJECTION, POWDER, FOR SOLUTION INTRAMUSCULAR; INTRAVENOUS at 19:34

## 2020-12-19 ASSESSMENT — PAIN SCALES - GENERAL: PAINLEVEL_OUTOF10: 5

## 2020-12-19 NOTE — ED NOTES
Bed: 08  Expected date:   Expected time:   Means of arrival:   Comments:  Get Aguilar RN  12/19/20 2271

## 2020-12-19 NOTE — ED PROVIDER NOTES
contributory    The patients home medications have been reviewed. Allergies: Bee venom, Shellfish-derived products, and Sulfa antibiotics        ---------------------------------------------------PHYSICAL EXAM--------------------------------------    Constitutional/General: Alert and oriented x3  Head: Normocephalic and atraumatic  Eyes: PERRL, EOMI, sclera non icteric  Mouth: Oropharynx clear, handling secretions, no trismus, no asymmetry of the posterior oropharynx or uvular edema  Neck: Supple, full ROM, no stridor, no meningeal signs  Respiratory: Lungs clear to auscultation bilaterally, no wheezes, rales, or rhonchi. Increased work of breathing with tachypnea  Cardiovascular:  Regular rate. Regular rhythm. No murmurs, no aortic murmurs, no gallops, or rubs. 2+ distal pulses. Equal extremity pulses. Chest: No chest wall tenderness  GI:  Abdomen Soft, Non tender, Non distended. No rebound, guarding, or rigidity. No pulsatile masses. Musculoskeletal: Moves all extremities x 4. Warm and well perfused, no clubbing, cyanosis, or edema. Capillary refill <3 seconds  Integument: skin warm and dry. No rashes. Neurologic: GCS 15, no focal deficits, symmetric strength 5/5 in the upper and lower extremities bilaterally  Psychiatric: Normal Affect          -------------------------------------------------- RESULTS -------------------------------------------------  I have personally reviewed all laboratory and imaging results for this patient. Results are listed below.      LABS: (Lab results interpreted by me)  Results for orders placed or performed during the hospital encounter of 12/19/20   Lactate, Sepsis   Result Value Ref Range    Lactic Acid, Sepsis 1.3 0.5 - 1.9 mmol/L   CBC Auto Differential   Result Value Ref Range    WBC 3.6 (L) 4.5 - 11.5 E9/L    RBC 3.45 (L) 3.80 - 5.80 E12/L    Hemoglobin 11.2 (L) 12.5 - 16.5 g/dL    Hematocrit 31.7 (L) 37.0 - 54.0 %    MCV 91.9 80.0 - 99.9 fL    MCH 32.5 26.0 - 35.0 pg MCHC 35.3 (H) 32.0 - 34.5 %    RDW 13.1 11.5 - 15.0 fL    Platelets 363 346 - 847 E9/L    MPV 9.9 7.0 - 12.0 fL    Neutrophils % 80.9 (H) 43.0 - 80.0 %    Lymphocytes % 11.3 (L) 20.0 - 42.0 %    Monocytes % 7.8 2.0 - 12.0 %    Eosinophils % 0.0 0.0 - 6.0 %    Basophils % 0.3 0.0 - 2.0 %    Neutrophils Absolute 2.92 1.80 - 7.30 E9/L    Lymphocytes Absolute 0.40 (L) 1.50 - 4.00 E9/L    Monocytes Absolute 0.29 0.10 - 0.95 E9/L    Eosinophils Absolute 0.00 (L) 0.05 - 0.50 E9/L    Basophils Absolute 0.00 0.00 - 0.20 E9/L    Polychromasia 2+     Hypochromia 1+    Basic Metabolic Panel w/ Reflex to MG   Result Value Ref Range    Sodium 129 (L) 132 - 146 mmol/L    Potassium reflex Magnesium 4.2 3.5 - 5.0 mmol/L    Chloride 96 (L) 98 - 107 mmol/L    CO2 19 (L) 22 - 29 mmol/L    Anion Gap 14 7 - 16 mmol/L    Glucose 119 (H) 74 - 99 mg/dL    BUN 24 (H) 8 - 23 mg/dL    CREATININE 1.5 (H) 0.7 - 1.2 mg/dL    GFR Non-African American 45 >=60 mL/min/1.73    GFR African American 54     Calcium 9.3 8.6 - 10.2 mg/dL   Troponin   Result Value Ref Range    Troponin <0.01 0.00 - 0.03 ng/mL   Brain Natriuretic Peptide   Result Value Ref Range    Pro- (H) 0 - 450 pg/mL   ,       RADIOLOGY:  Interpreted by Radiologist unless otherwise specified  CTA PULMONARY W CONTRAST   Final Result   1. No pulmonary embolism. 2. Diffuse pulmonary infiltrates throughout the bilateral lungs typical for   insert COVID, mild pulmonary disease based on approximate percentage of   pulmonary parenchymal involvement. Note that CT pulmonary findings of   COVID-19 show overlap with other disease entities including H1N1 influenza,   other viral pneumonia (i.e. adenovirus, CMV), organizing pneumonia, alveolar   proteinosis and acute interstitial pneumonitis. 3. Mild cardiomegaly. 4. Calcific atherosclerosis, status post CABG.             EKG Interpretation  Interpreted by emergency department physician, Dr. Hinkle Fraction    Date of EK20  Time: 1840    Rhythm: normal sinus   Rate: 84  Axis: normal  Conduction: left anterior fasciclar block  ST Segments: no acute change  T Waves: no acute change    Clinical Impression: No findings suggestive of acute ischemia or injury  Comparison to prior EKG: stable as compared to patient's most recent EKG      ------------------------- NURSING NOTES AND VITALS REVIEWED ---------------------------   The nursing notes within the ED encounter and vital signs as below have been reviewed by myself  BP (!) 170/92   Pulse 86   Temp 102.8 °F (39.3 °C) (Oral)   Resp 24   Ht 5' 10\" (1.778 m)   Wt 190 lb (86.2 kg)   SpO2 96%   BMI 27.26 kg/m²     Oxygen Saturation Interpretation: Improved after treatment    The patients available past medical records and past encounters were reviewed.         ------------------------------ ED COURSE/MEDICAL DECISION MAKING----------------------  Medications   sodium chloride flush 0.9 % injection 10 mL (has no administration in time range)   sodium chloride flush 0.9 % injection 10 mL (has no administration in time range)   polyethylene glycol (GLYCOLAX) packet 17 g (has no administration in time range)   acetaminophen (TYLENOL) tablet 650 mg (has no administration in time range)     Or   acetaminophen (TYLENOL) suppository 650 mg (has no administration in time range)   trimethobenzamide (TIGAN) injection 200 mg (has no administration in time range)   albuterol sulfate  (90 Base) MCG/ACT inhaler 2 puff (has no administration in time range)   benzonatate (TESSALON) capsule 100 mg (has no administration in time range)   dexamethasone (DECADRON) injection 6 mg (has no administration in time range)   ascorbic acid (VITAMIN C) tablet 500 mg (has no administration in time range)   zinc sulfate (ZINCATE) capsule 50 mg (has no administration in time range)   aspirin tablet 325 mg (has no administration in time range)   atorvastatin (LIPITOR) tablet 20 mg (has no administration in time range) shortness of breath. He arrives hypoxic and febrile with increased work of breathing and tachypnea. EKG shows no signs of ischemia or injury. Troponin is negative, BNP slightly elevated. Metabolic panel is within acceptable limits, no leukocytosis or anemia. Lactic acid is normal.  CT angiography of the chest shows no PE but does have groundglass opacity throughout concerning for ongoing COVID-19. Patient had been given steroid for possible dye allergy. He is resting comfortably with 4 L nasal cannula but remains oxygen dependent. Fever improved after Tylenol and IV fluids. He will be admitted for further management. Re-Evaluations: This patient's ED course included: a personal history and physicial examination, re-evaluation prior to disposition, IV medications, cardiac monitoring and continuous pulse oximetry    This patient has remained hemodynamically stable, improved and been closely monitored during their ED course. Counseling: The emergency provider has spoken with the patient and discussed todays results, in addition to providing specific details for the plan of care and counseling regarding the diagnosis and prognosis. Questions are answered at this time and they are agreeable with the plan.       --------------------------------- IMPRESSION AND DISPOSITION ---------------------------------    IMPRESSION  1. COVID-19 virus infection    2. Hypoxia        DISPOSITION  Disposition: Admit to telemetry  Patient condition is stable        NOTE: This report was transcribed using voice recognition software.  Every effort was made to ensure accuracy; however, inadvertent computerized transcription errors may be present        Tammi Banuelos DO  12/19/20 2146

## 2020-12-20 LAB
ADENOVIRUS BY PCR: NOT DETECTED
ANION GAP SERPL CALCULATED.3IONS-SCNC: 12 MMOL/L (ref 7–16)
BASOPHILS ABSOLUTE: 0 E9/L (ref 0–0.2)
BASOPHILS RELATIVE PERCENT: 0 % (ref 0–2)
BORDETELLA PARAPERTUSSIS BY PCR: NOT DETECTED
BORDETELLA PERTUSSIS BY PCR: NOT DETECTED
BUN BLDV-MCNC: 26 MG/DL (ref 8–23)
BURR CELLS: ABNORMAL
CALCIUM SERPL-MCNC: 9.3 MG/DL (ref 8.6–10.2)
CHLAMYDOPHILIA PNEUMONIAE BY PCR: NOT DETECTED
CHLORIDE BLD-SCNC: 102 MMOL/L (ref 98–107)
CO2: 21 MMOL/L (ref 22–29)
CORONAVIRUS 229E BY PCR: NOT DETECTED
CORONAVIRUS HKU1 BY PCR: NOT DETECTED
CORONAVIRUS NL63 BY PCR: NOT DETECTED
CORONAVIRUS OC43 BY PCR: NOT DETECTED
CREAT SERPL-MCNC: 1.5 MG/DL (ref 0.7–1.2)
EOSINOPHILS ABSOLUTE: 0 E9/L (ref 0.05–0.5)
EOSINOPHILS RELATIVE PERCENT: 0 % (ref 0–6)
GFR AFRICAN AMERICAN: 54
GFR NON-AFRICAN AMERICAN: 45 ML/MIN/1.73
GLUCOSE BLD-MCNC: 161 MG/DL (ref 74–99)
HCT VFR BLD CALC: 34.2 % (ref 37–54)
HEMOGLOBIN: 11.5 G/DL (ref 12.5–16.5)
HUMAN METAPNEUMOVIRUS BY PCR: NOT DETECTED
HUMAN RHINOVIRUS/ENTEROVIRUS BY PCR: NOT DETECTED
INFLUENZA A BY PCR: NOT DETECTED
INFLUENZA B BY PCR: NOT DETECTED
LYMPHOCYTES ABSOLUTE: 0.29 E9/L (ref 1.5–4)
LYMPHOCYTES RELATIVE PERCENT: 13.3 % (ref 20–42)
MCH RBC QN AUTO: 32.2 PG (ref 26–35)
MCHC RBC AUTO-ENTMCNC: 33.6 % (ref 32–34.5)
MCV RBC AUTO: 95.8 FL (ref 80–99.9)
METER GLUCOSE: 115 MG/DL (ref 74–99)
METER GLUCOSE: 136 MG/DL (ref 74–99)
METER GLUCOSE: 166 MG/DL (ref 74–99)
METER GLUCOSE: 166 MG/DL (ref 74–99)
METER GLUCOSE: 169 MG/DL (ref 74–99)
MONOCYTES ABSOLUTE: 0.09 E9/L (ref 0.1–0.95)
MONOCYTES RELATIVE PERCENT: 4.4 % (ref 2–12)
MYCOPLASMA PNEUMONIAE BY PCR: NOT DETECTED
NEUTROPHILS ABSOLUTE: 1.8 E9/L (ref 1.8–7.3)
NEUTROPHILS RELATIVE PERCENT: 82.3 % (ref 43–80)
PARAINFLUENZA VIRUS 1 BY PCR: NOT DETECTED
PARAINFLUENZA VIRUS 2 BY PCR: NOT DETECTED
PARAINFLUENZA VIRUS 3 BY PCR: NOT DETECTED
PARAINFLUENZA VIRUS 4 BY PCR: NOT DETECTED
PDW BLD-RTO: 13.1 FL (ref 11.5–15)
PLATELET # BLD: 252 E9/L (ref 130–450)
PMV BLD AUTO: 10.5 FL (ref 7–12)
POIKILOCYTES: ABNORMAL
POTASSIUM REFLEX MAGNESIUM: 4.2 MMOL/L (ref 3.5–5)
RBC # BLD: 3.57 E12/L (ref 3.8–5.8)
RESPIRATORY SYNCYTIAL VIRUS BY PCR: NOT DETECTED
SARS-COV-2, PCR: NOT DETECTED
SODIUM BLD-SCNC: 135 MMOL/L (ref 132–146)
WBC # BLD: 2.2 E9/L (ref 4.5–11.5)

## 2020-12-20 PROCEDURE — 6370000000 HC RX 637 (ALT 250 FOR IP): Performed by: NURSE PRACTITIONER

## 2020-12-20 PROCEDURE — 85025 COMPLETE CBC W/AUTO DIFF WBC: CPT

## 2020-12-20 PROCEDURE — 6360000002 HC RX W HCPCS: Performed by: NURSE PRACTITIONER

## 2020-12-20 PROCEDURE — 2060000000 HC ICU INTERMEDIATE R&B

## 2020-12-20 PROCEDURE — 82962 GLUCOSE BLOOD TEST: CPT

## 2020-12-20 PROCEDURE — 2580000003 HC RX 258: Performed by: INTERNAL MEDICINE

## 2020-12-20 PROCEDURE — 2700000000 HC OXYGEN THERAPY PER DAY

## 2020-12-20 PROCEDURE — 2500000003 HC RX 250 WO HCPCS: Performed by: INTERNAL MEDICINE

## 2020-12-20 PROCEDURE — 0202U NFCT DS 22 TRGT SARS-COV-2: CPT

## 2020-12-20 PROCEDURE — 80048 BASIC METABOLIC PNL TOTAL CA: CPT

## 2020-12-20 PROCEDURE — 36415 COLL VENOUS BLD VENIPUNCTURE: CPT

## 2020-12-20 PROCEDURE — 2580000003 HC RX 258: Performed by: NURSE PRACTITIONER

## 2020-12-20 RX ADMIN — ASPIRIN 325 MG: 325 TABLET, FILM COATED ORAL at 09:03

## 2020-12-20 RX ADMIN — Medication 50 MG: at 09:03

## 2020-12-20 RX ADMIN — ALBUTEROL SULFATE 2 PUFF: 108 INHALANT RESPIRATORY (INHALATION) at 16:08

## 2020-12-20 RX ADMIN — ALBUTEROL SULFATE 2 PUFF: 108 INHALANT RESPIRATORY (INHALATION) at 20:38

## 2020-12-20 RX ADMIN — INSULIN LISPRO 1 UNITS: 100 INJECTION, SOLUTION INTRAVENOUS; SUBCUTANEOUS at 06:04

## 2020-12-20 RX ADMIN — ALBUTEROL SULFATE 2 PUFF: 108 INHALANT RESPIRATORY (INHALATION) at 11:06

## 2020-12-20 RX ADMIN — ATORVASTATIN CALCIUM 20 MG: 20 TABLET, FILM COATED ORAL at 20:34

## 2020-12-20 RX ADMIN — INSULIN LISPRO 1 UNITS: 100 INJECTION, SOLUTION INTRAVENOUS; SUBCUTANEOUS at 01:05

## 2020-12-20 RX ADMIN — BENZONATATE 100 MG: 100 CAPSULE ORAL at 09:03

## 2020-12-20 RX ADMIN — PANTOPRAZOLE SODIUM 40 MG: 40 TABLET, DELAYED RELEASE ORAL at 05:57

## 2020-12-20 RX ADMIN — FENOFIBRATE 160 MG: 160 TABLET ORAL at 09:03

## 2020-12-20 RX ADMIN — Medication 10 ML: at 01:30

## 2020-12-20 RX ADMIN — ATORVASTATIN CALCIUM 20 MG: 20 TABLET, FILM COATED ORAL at 02:45

## 2020-12-20 RX ADMIN — OXYCODONE HYDROCHLORIDE AND ACETAMINOPHEN 500 MG: 500 TABLET ORAL at 09:03

## 2020-12-20 RX ADMIN — INSULIN LISPRO 1 UNITS: 100 INJECTION, SOLUTION INTRAVENOUS; SUBCUTANEOUS at 20:58

## 2020-12-20 RX ADMIN — Medication 5000 UNITS: at 09:02

## 2020-12-20 RX ADMIN — Medication 10 ML: at 20:39

## 2020-12-20 RX ADMIN — METOPROLOL TARTRATE 50 MG: 50 TABLET, FILM COATED ORAL at 09:03

## 2020-12-20 RX ADMIN — DEXAMETHASONE SODIUM PHOSPHATE 6 MG: 10 INJECTION INTRAMUSCULAR; INTRAVENOUS at 18:35

## 2020-12-20 RX ADMIN — REMDESIVIR 200 MG: 100 INJECTION, POWDER, LYOPHILIZED, FOR SOLUTION INTRAVENOUS at 02:45

## 2020-12-20 RX ADMIN — Medication 10 ML: at 09:03

## 2020-12-20 RX ADMIN — REMDESIVIR 100 MG: 100 INJECTION, POWDER, LYOPHILIZED, FOR SOLUTION INTRAVENOUS at 18:35

## 2020-12-20 RX ADMIN — ALBUTEROL SULFATE 2 PUFF: 108 INHALANT RESPIRATORY (INHALATION) at 03:27

## 2020-12-20 ASSESSMENT — PAIN SCALES - GENERAL
PAINLEVEL_OUTOF10: 0
PAINLEVEL_OUTOF10: 0

## 2020-12-20 NOTE — PLAN OF CARE
Problem: Airway Clearance - Ineffective  Goal: Achieve or maintain patent airway  Outcome: Met This Shift     Problem: Gas Exchange - Impaired  Goal: Absence of hypoxia  Outcome: Met This Shift  Goal: Promote optimal lung function  Outcome: Met This Shift     Problem:  Body Temperature -  Risk of, Imbalanced  Goal: Ability to maintain a body temperature within defined limits  Outcome: Met This Shift  Goal: Will regain or maintain usual level of consciousness  Outcome: Met This Shift  Goal: Complications related to the disease process, condition or treatment will be avoided or minimized  Outcome: Met This Shift     Problem: Isolation Precautions - Risk of Spread of Infection  Goal: Prevent transmission of infection  Outcome: Met This Shift     Problem: Nutrition Deficits  Goal: Optimize nutrtional status  Outcome: Met This Shift     Problem: Risk for Fluid Volume Deficit  Goal: Maintain normal heart rhythm  Outcome: Met This Shift  Goal: Maintain absence of muscle cramping  Outcome: Met This Shift  Goal: Maintain normal serum potassium, sodium, calcium, phosphorus, and pH  Outcome: Met This Shift     Problem: Loneliness or Risk for Loneliness  Goal: Demonstrate positive use of time alone when socialization is not possible  Outcome: Met This Shift     Problem: Fatigue  Goal: Verbalize increase energy and improved vitality  Outcome: Met This Shift     Problem: Patient Education: Go to Patient Education Activity  Goal: Patient/Family Education  Outcome: Met This Shift

## 2020-12-20 NOTE — H&P
Yamel Lundberg M.D. History and Physical      CHIEF COMPLAINT: Shortness of breath    Reason for Admission: As above/COVID-19    History Obtained From: Patient/EMR    HISTORY OF PRESENT ILLNESS:      The patient is a 82 y.o. male of [facility] with significant past medical history of CAD, diabetes type 2, hypertension hyperlipidemia and sleep apnea who presents with complaints of cough, fever, chills,  weakness, shortness of breath worsening symptoms since he was diagnosed approximately 10 days ago. Shortness of breath has progressed to being at rest rather than on exertion. At that point he decided to come in for further evaluation and treatment to the ER his symptoms have started worsening at day 7-8  from diagnosis. On my evaluation, he indicates he feels slightly better than admission. Currently only on 2-1/2 L of oxygen. Just weak and tired.     /72   Pulse 70   Temp 95.9 °F (35.5 °C) (Axillary)   Resp 18   Ht 5' 10\" (1.778 m)   Wt 170 lb 12.8 oz (77.5 kg)   SpO2 96%   BMI 24.51 kg/m²       CRP 9.7,   All labs personally reviewed, BUN/creatinine 26/1.5, white count 2.2, SARS-CoV-2 not tested or repeated in the ER  All imaging personally reviewed-CTA chest with diffuse pulmonary infiltrates throughout bilateral lungs consistent with COVID-19 pneumonia    Past Medical History:        Diagnosis Date    ACS (acute coronary syndrome) ([facility] [address] 5/1998    Hyperlipidemia     Hypertension     Sleep apnea     Type II or unspecified type diabetes mellitus without mention of complication, not stated as uncontrolled      Past Surgical History:        Procedure Laterality Date    CHOLECYSTECTOMY      CORONARY ARTERY BYPASS GRAFT  1998    LIMA-LAD, SVG-RCA    TOTAL KNEE ARTHROPLASTY  2000         Medications Prior to Admission:    Medications Prior to Admission: metoprolol tartrate (LOPRESSOR) 50 MG tablet, Take 50 mg by mouth daily  omeprazole (PRILOSEC) 20 MG delayed release capsule, Take 40 mg by mouth Daily  Cholecalciferol (VITAMIN D3) 5000 units TABS, Take 5,000 Units by mouth daily  fenofibrate 160 MG tablet, TAKE 1 TABLET BY MOUTH EVERY DAY  atorvastatin (LIPITOR) 20 MG tablet, 20 mg daily  aspirin 325 MG tablet, Take 325 mg by mouth daily. CPAP Machine MISC, by Does not apply route every evening. metformin (GLUCOPHAGE) 500 MG tablet, Take 500 mg by mouth 2 times daily (with meals). multivitamin (THERAGRAN) per tablet, Take 1 tablet by mouth daily. Allergies:  Bee venom, Shellfish-derived products, and Sulfa antibiotics    Social History:   TOBACCO:   reports that he quit smoking about 57 years ago. His smoking use included cigarettes. He has a 10.00 pack-year smoking history. He has never used smokeless tobacco.  ETOH:   reports no history of alcohol use. MARITAL STATUS:    OCCUPATION:      Family History:   History reviewed. No pertinent family history. REVIEW OF SYSTEMS:    General ROS: positive for  - fatigue and malaise, weakness fatigue  Hematological and Lymphatic ROS: negative  Endocrine ROS: negative  Respiratory ROS: no cough, shortness of breath, or wheezing  Cardiovascular ROS: no chest pain or dyspnea on exertion  Gastrointestinal ROS: no abdominal pain, change in bowel habits, or black or bloody stools  Genito-Urinary ROS: no dysuria, trouble voiding, or hematuria  Neurological ROS: no TIA or stroke symptoms  negative    Vitals:  /72   Pulse 70   Temp 95.9 °F (35.5 °C) (Axillary)   Resp 18   Ht 5' 10\" (1.778 m)   Wt 170 lb 12.8 oz (77.5 kg)   SpO2 96%   BMI 24.51 kg/m²     PHYSICAL EXAM:  General:  Awake, alert, oriented X 3. Well developed, well nourished, well groomed. No apparent distress. HEENT:  Normocephalic, atraumatic. Pupils equal, round, reactive to light. No scleral icterus. No conjunctival injection. Normal lips, teeth, and gums. No nasal discharge.   Neck:  Supple, FROM  Heart:  RRR, no murmurs, gallops, rubs, carotid upstroke normal, no carotid bruits  Lungs:  CTA bilaterally, bilat symmetrical expansion, no wheeze, rales, or rhonchi  Abdomen: Bowel sounds present, soft, nontender, no masses, no organomegaly, no peritoneal signs  Extremities:  No clubbing, cyanosis, or edema  Skin:  Warm and dry, no open lesions or rash  Neuro:  Cranial nerves 2-12 intact, no focal deficits  Vascular: Radial and pedal Pulses 2+  Breast: deferred  Rectal: deferred  Genitalia:  deferred      DATA:     Recent Labs     12/19/20 1856 12/20/20  0600   WBC 3.6* 2.2*   HGB 11.2* 11.5*    252     Recent Labs     12/19/20 1856 12/20/20  0600   * 135   K 4.2 4.2   BUN 24* 26*   CREATININE 1.5* 1.5*     No results for input(s): PROT, INR in the last 72 hours. No results for input(s): AST, ALT, ALKPHOS, BILIDIR, BILITOT in the last 72 hours. No results for input(s): BNP in the last 72 hours. Recent Labs     12/19/20 1856   TROPONINI <0.01       ASSESSMENT:      Principal Problem:    Acute respiratory failure with hypoxia (HCC)  Active Problems:    Hypertension    Hyperlipidemia    COVID-19 virus infection    Type 2 diabetes mellitus, without long-term current use of insulin (HCC)    CARLOS on CPAP    CAD (coronary artery disease)    Elevated serum creatinine    Anemia    Hyponatremia    Gastroesophageal reflux disease without esophagitis  Resolved Problems:    * No resolved hospital problems.  *        PLAN:    Admit to telemetry for evaluation of worsening symptoms and patient was tested approximately 10 days ago for SARS-CoV-2 and was positive  Retest viral panel   Already initiated on remdesivir and Decadron  IV fluids if not tolerating p.o. intake  Anti-inflammatory markers every other day  Supportive care    DVT prophylaxis  PT OT  Discharge plan    Alex Rene MD  12/20/2020  11:28 AM

## 2020-12-21 LAB
ACINETOBACTER BAUMANNII BY PCR: NOT DETECTED
BOTTLE TYPE: ABNORMAL
CANDIDA ALBICANS BY PCR: NOT DETECTED
CANDIDA GLABRATA BY PCR: NOT DETECTED
CANDIDA KRUSEI BY PCR: NOT DETECTED
CANDIDA PARAPSILOSIS BY PCR: NOT DETECTED
CANDIDA TROPICALIS BY PCR: NOT DETECTED
EKG ATRIAL RATE: 84 BPM
EKG P AXIS: -23 DEGREES
EKG P-R INTERVAL: 154 MS
EKG Q-T INTERVAL: 398 MS
EKG QRS DURATION: 108 MS
EKG QTC CALCULATION (BAZETT): 470 MS
EKG R AXIS: -46 DEGREES
EKG T AXIS: 29 DEGREES
EKG VENTRICULAR RATE: 84 BPM
ENTEROBACTER CLOACAE COMPLEX BY PCR: NOT DETECTED
ENTEROBACTERALES BY PCR: NOT DETECTED
ENTEROCOCCUS BY PCR: NOT DETECTED
ESCHERICHIA COLI BY PCR: NOT DETECTED
HAEMOPHILUS INFLUENZAE BY PCR: NOT DETECTED
KLEBSIELLA OXYTOCA BY PCR: NOT DETECTED
KLEBSIELLA PNEUMONIAE GROUP BY PCR: NOT DETECTED
LACTIC ACID: 2.6 MMOL/L (ref 0.5–2.2)
LISTERIA MONOCYTOGENES BY PCR: NOT DETECTED
METER GLUCOSE: 179 MG/DL (ref 74–99)
METER GLUCOSE: 199 MG/DL (ref 74–99)
METER GLUCOSE: 227 MG/DL (ref 74–99)
METER GLUCOSE: 266 MG/DL (ref 74–99)
METHICILLIN RESISTANCE MECA/C  BY PCR: NOT DETECTED
NEISSERIA MENINGITIDIS BY PCR: NOT DETECTED
ORDER NUMBER: ABNORMAL
PROCALCITONIN: 0.14 NG/ML (ref 0–0.08)
PROTEUS SPECIES BY PCR: NOT DETECTED
PSEUDOMONAS AERUGINOSA BY PCR: NOT DETECTED
SERRATIA MARCESCENS BY PCR: NOT DETECTED
SOURCE OF BLOOD CULTURE: ABNORMAL
STAPHYLOCOCCUS AUREUS BY PCR: NOT DETECTED
STAPHYLOCOCCUS SPECIES BY PCR: DETECTED
STREPTOCOCCUS AGALACTIAE BY PCR: NOT DETECTED
STREPTOCOCCUS PNEUMONIAE BY PCR: NOT DETECTED
STREPTOCOCCUS PYOGENES  BY PCR: NOT DETECTED
STREPTOCOCCUS SPECIES BY PCR: NOT DETECTED

## 2020-12-21 PROCEDURE — 2580000003 HC RX 258: Performed by: INTERNAL MEDICINE

## 2020-12-21 PROCEDURE — 6370000000 HC RX 637 (ALT 250 FOR IP): Performed by: NURSE PRACTITIONER

## 2020-12-21 PROCEDURE — 2700000000 HC OXYGEN THERAPY PER DAY

## 2020-12-21 PROCEDURE — 97530 THERAPEUTIC ACTIVITIES: CPT

## 2020-12-21 PROCEDURE — 97161 PT EVAL LOW COMPLEX 20 MIN: CPT

## 2020-12-21 PROCEDURE — 36415 COLL VENOUS BLD VENIPUNCTURE: CPT

## 2020-12-21 PROCEDURE — 97166 OT EVAL MOD COMPLEX 45 MIN: CPT

## 2020-12-21 PROCEDURE — 83605 ASSAY OF LACTIC ACID: CPT

## 2020-12-21 PROCEDURE — 82962 GLUCOSE BLOOD TEST: CPT

## 2020-12-21 PROCEDURE — 6360000002 HC RX W HCPCS: Performed by: NURSE PRACTITIONER

## 2020-12-21 PROCEDURE — 2580000003 HC RX 258: Performed by: NURSE PRACTITIONER

## 2020-12-21 PROCEDURE — 2060000000 HC ICU INTERMEDIATE R&B

## 2020-12-21 PROCEDURE — 84145 PROCALCITONIN (PCT): CPT

## 2020-12-21 PROCEDURE — 2500000003 HC RX 250 WO HCPCS

## 2020-12-21 PROCEDURE — 2580000003 HC RX 258

## 2020-12-21 RX ADMIN — Medication 10 ML: at 08:42

## 2020-12-21 RX ADMIN — DEXAMETHASONE SODIUM PHOSPHATE 6 MG: 10 INJECTION INTRAMUSCULAR; INTRAVENOUS at 08:42

## 2020-12-21 RX ADMIN — ALBUTEROL SULFATE 2 PUFF: 108 INHALANT RESPIRATORY (INHALATION) at 04:00

## 2020-12-21 RX ADMIN — INSULIN LISPRO 3 UNITS: 100 INJECTION, SOLUTION INTRAVENOUS; SUBCUTANEOUS at 18:05

## 2020-12-21 RX ADMIN — INSULIN LISPRO 1 UNITS: 100 INJECTION, SOLUTION INTRAVENOUS; SUBCUTANEOUS at 06:09

## 2020-12-21 RX ADMIN — Medication 50 MG: at 08:41

## 2020-12-21 RX ADMIN — FENOFIBRATE 160 MG: 160 TABLET ORAL at 08:40

## 2020-12-21 RX ADMIN — ATORVASTATIN CALCIUM 20 MG: 20 TABLET, FILM COATED ORAL at 20:51

## 2020-12-21 RX ADMIN — ALBUTEROL SULFATE 2 PUFF: 108 INHALANT RESPIRATORY (INHALATION) at 08:54

## 2020-12-21 RX ADMIN — INSULIN LISPRO 1 UNITS: 100 INJECTION, SOLUTION INTRAVENOUS; SUBCUTANEOUS at 13:43

## 2020-12-21 RX ADMIN — ALBUTEROL SULFATE 2 PUFF: 108 INHALANT RESPIRATORY (INHALATION) at 14:02

## 2020-12-21 RX ADMIN — Medication 10 ML: at 20:54

## 2020-12-21 RX ADMIN — REMDESIVIR 100 MG: 100 INJECTION, POWDER, LYOPHILIZED, FOR SOLUTION INTRAVENOUS at 18:16

## 2020-12-21 RX ADMIN — ASPIRIN 325 MG: 325 TABLET, FILM COATED ORAL at 08:41

## 2020-12-21 RX ADMIN — INSULIN LISPRO 1 UNITS: 100 INJECTION, SOLUTION INTRAVENOUS; SUBCUTANEOUS at 20:52

## 2020-12-21 RX ADMIN — METOPROLOL TARTRATE 50 MG: 50 TABLET, FILM COATED ORAL at 08:41

## 2020-12-21 RX ADMIN — Medication 5000 UNITS: at 08:40

## 2020-12-21 RX ADMIN — OXYCODONE HYDROCHLORIDE AND ACETAMINOPHEN 500 MG: 500 TABLET ORAL at 08:41

## 2020-12-21 RX ADMIN — Medication 1 TABLET: at 08:41

## 2020-12-21 RX ADMIN — ALBUTEROL SULFATE 2 PUFF: 108 INHALANT RESPIRATORY (INHALATION) at 20:51

## 2020-12-21 RX ADMIN — PANTOPRAZOLE SODIUM 40 MG: 40 TABLET, DELAYED RELEASE ORAL at 05:27

## 2020-12-21 ASSESSMENT — PAIN SCALES - GENERAL
PAINLEVEL_OUTOF10: 0
PAINLEVEL_OUTOF10: 0

## 2020-12-21 NOTE — PROGRESS NOTES
Physical Therapy    Physical Therapy Initial Assessment     Name: Vishnu Snowden  : 1935  MRN: [id number]    Referring Provider:        COLT Eduardo CNP         Date of Service: 2020    Evaluating PT:  Ellyn Lee PT, DPT [id number]     Room #:  4407/9647-U  Diagnosis:  COVID 19  PMHx/PSHx:  ACS, HLD, HTN, sleep apnea, type II DM  Precautions:  Falls, Droplet Plus isolation, COVID-19, O2  Equipment Needs:  TBD    SUBJECTIVE:    Pt lives with wife in a 2 floor apartment with no stairs to enter. Bedroom and bathroom are on the lower level. Pt reports there are 3 steps down with B rails to bedroom and bathroom. Pt ambulated with no AD, drives, independent PTA. OBJECTIVE:   Initial Evaluation  Date: 20 Treatment Short Term/ Long Term   Goals   AM-PAC 6 Clicks /29     Was pt agreeable to Eval/treatment? Yes     Does pt have pain? No c/o pain      Bed Mobility  Rolling: SBA  Supine to sit: SBA  Sit to supine: NT  Scooting: SBA  Rolling: Independent   Supine to sit: Independent   Sit to supine: Independent   Scooting: Independent    Transfers Sit to stand: SBA  Stand to sit: SBA  Stand pivot: SBA with no AD  Sit to stand: Independent   Stand to sit:  Independent   Stand pivot: Independent    Ambulation    45 feet with no AD Min A  >150 feet with no AD Independent    Stair negotiation: ascended and descended  NT  >4 steps with 1 rail Modified Independent     ROM BUE:  WFL  BLE:  WFL     Strength BUE:  NT  BLE:  Grossly 4+/5      Balance Sitting EOB:  SBA  Dynamic Standing:  CGA<>Min A  Sitting EOB:  Independent   Dynamic Standing:  Independent      Pt is A & O x 4  Sensation:  Pt denies numbness and tingling to extremities  Edema:  Unremarkable     Vitals:  HR 66 bpm SpO2 98% on 4 L at rest  HR 72 bpm SpO2 92% on 4 L immediately after ambulation   HR 65 bpm SpO2 96% on 4 L while sitting in chair on exit     Therapeutic Exercises:     BLE ROM    Patient education  Pt educated on safety during functional mobility, PLB, up to chair     Patient response to education:   Pt verbalized understanding Pt demonstrated skill Pt requires further education in this area   Yes  Yes  Reinforce      ASSESSMENT:    Comments:  Pt received supine and agreeable to PT evaluation. Vitals monitored during session. Able to perform bed mobility without physical assist.  Pt tolerated session well. Ambulates with mild unsteadiness. Hands on assist from PT to ensure fall prevention and safety. Ambulated around room and back to bedside chair. SpO2 >90% with ambulation. Pt quickly recovers >95%. Left sitting in chair. Pt left with call button in reach, lines attached, and needs met. Treatment:  Patient practiced and was instructed in the following treatment:     Bed mobility training - pt given verbal and tactile cues to facilitate proper sequencing and safety during rolling and supine>sit as well as provided with physical assistance to complete task     Gait training- pt was given verbal and tactile cues to facilitate safety/balance during ambulation as well as provided with physical assistance to complete task.  Positioning in bedside chair to maximize time OOB     Pt's/ family goals   1. Home     Patient and or family understand(s) diagnosis, prognosis, and plan of care. Yes     PLAN:    Current Treatment Recommendations     [] Strengthening     [] ROM   [x] Balance Training   [x] Endurance Training   [x] Transfer Training   [x] Gait Training   [x] Stair Training   [x] Positioning   [x] Safety and Education Training   [x] Patient/Caregiver Education   [x] HEP  [] Other     Frequency of treatments: 2-5x/week x 1-2 weeks.     Time in  0930  Time out  0952    Total Treatment Time  10 minutes     Evaluation Time includes thorough review of current medical information, gathering information on past medical history/social history and prior level of function, completion of standardized testing/informal observation of tasks, assessment of data and education on plan of care and goals.     CPT codes:  [x] Low Complexity PT evaluation 86675  [] Moderate Complexity PT evaluation 49129  [] High Complexity PT evaluation 07255  [] PT Re-evaluation 89643  [] Gait training 83624 -- minutes  [] Manual therapy [id number] -- minutes  [x] Therapeutic activities 34107 10 minutes  [] Therapeutic exercises 62629 - minutes  [] Neuromuscular reeducation 30849 -- minutes     Charan Fisher, PT, DPT  [id number]

## 2020-12-21 NOTE — CARE COORDINATION
Met with pt at bedside; up in chair eating lunch independently, able to converse easily. Pt reports from home with spouse; three steps to enter, bedroom six steps down, kitchen upstairs however, reports no issues ambulating, has handrails; no DME, currently on 4 liters at 94% (no home oxygen); discharge plan is to return home with spouse, no needs, family to provide transportation at time of discharge; will follow for any needs; verified pcp and pharmacy.

## 2020-12-21 NOTE — PLAN OF CARE
Problem: Risk for Fluid Volume Deficit  Goal: Maintain normal heart rhythm  12/21/2020 0056 by Yisel Ramirez RN  Outcome: Met This Shift

## 2020-12-21 NOTE — PROGRESS NOTES
Subjective: The patient is awake and alert. Sitting up in chair on my evaluation-denies any complaints, tolerating p.o. intake and feels well  Feels well  On 3 L oxygen at rest and essentially went down to 90% on 4 L while ambulating  Denies fevers chills or any other complaints    Objective:    BP (!) 149/79   Pulse 75   Temp 97.4 °F (36.3 °C) (Temporal)   Resp 18   Ht 5' 10\" (1.778 m)   Wt 170 lb (77.1 kg)   SpO2 94%   BMI 24.39 kg/m²     In: 240 [P.O.:240]  Out: 0   In: 240   Out: 0     General appearance: NAD, conversant  HEENT: AT/NC, MMM  Neck: FROM, supple  Lungs: Clear to auscultation  CV: RRR, no MRGs  Vasc: Radial pulses 2+  Abdomen: Soft, non-tender; no masses or HSM  Extremities: No peripheral edema or digital cyanosis  Skin: no rash, lesions or ulcers  Psych: Alert and oriented to person, place and time  Neuro: Alert and interactive     Recent Labs     12/19/20 1856 12/20/20  0600   WBC 3.6* 2.2*   HGB 11.2* 11.5*   HCT 31.7* 34.2*    252       Recent Labs     12/19/20 1856 12/20/20  0600   * 135   K 4.2 4.2   CL 96* 102   CO2 19* 21*   BUN 24* 26*   CREATININE 1.5* 1.5*   CALCIUM 9.3 9.3       Assessment:    Principal Problem:    Acute respiratory failure with hypoxia (HCC)  Active Problems:    Hypertension    Hyperlipidemia    COVID-19 virus infection    Type 2 diabetes mellitus, without long-term current use of insulin (HCC)    CARLOS on CPAP    CAD (coronary artery disease)    Elevated serum creatinine    Anemia    Hyponatremia    Gastroesophageal reflux disease without esophagitis  Resolved Problems:    * No resolved hospital problems.  *      Plan:    Admit to telemetry for evaluation of worsening symptoms and patient was tested approximately 10 days ago for SARS-CoV-2 and was positive  Already initiated on remdesivir and Decadron  IV fluids if not tolerating p.o. intake-tolerating p.o. intake  Covid from 12/15 is positive: 12/20 SARS-CoV-2 negative   anti-inflammatory markers every other day  1 out of 4 blood cultures positive for GPC in clusters-likely cons/contaminant as patient is asymptomatic.   No fevers no white count  Supportive care  Check lactic acid and procalcitonin-if negative patient can be discharged later today as I think the blood cultures are contaminant      Ambulate to check pulse ox-maintain saturations of 90% on 4 L of oxygen    DVT Prophylaxis   PT/OT  Discharge planning           Mario Pulido MD  10:14 AM  12/21/2020

## 2020-12-21 NOTE — PROGRESS NOTES
Occupational Therapy  OCCUPATIONAL THERAPY INITIAL EVALUATION      Date:2020  Patient Name: Yusuf Harrison  MRN: [id number]  : 1935  Room: [address] [place], [place] [address]    Evaluating OT: Trina Roche OTR/L #[id number]    Referring physician: COLT Eduardo - CNP  AM-PAC Daily Activity Raw Score:   Recommended Adaptive Equipment: Patient may benefit from a shower chair. Will continue to assess most appropriate recommendations. Reason for Admission/Diagnosis: Acute respiratory failure with hypoxia, COVID-19 virus infection  Pertinent Medical History/Surgery: acute coronary syndrome, HLD, HTN, sleep apnea, DM type II, cholecystectomy, CABG, TKA     Precautions:  Droplet plus (COVID-19), Falls, tele, supplemental O2     Home Living: Pt lives with wife in a multi-level apartment unit with no steps to enter. . Patient has 3 steps down with B railing to bedroom/bathroom. Kitchen/living area is located on main living area. Bathroom setup: Walk-in shower, no modifications   Equipment owned: none  Prior Level of Function:   I with ADLs , assist with IADLs. Patient's wife completes home management tasks including cooking, cleaning, and laundry. Patient was using no device for functional mobility.    Driving: yes                             Occupation: Retired, installed seven  Leisure:woodworking    Pain Level: No pain reported  Cognition: A&O: self:yes, place:yes, time: yes, situation:yes  Follows 2-3 step directions   Memory:  good    Sequencing:  good    Problem solving:  fair    Judgement/safety:  fair      Functional Assessment:   Initial Eval Status  Date: 20 Treatment Status  Date: Short Term Goals=LTG  Treatment frequency: PRN 1-3 x/week   Feeding Set-up/Stand by Assist   Modified [place]    Grooming Minimal Assist   Modified [place]     UB Dressing Minimal Assist   Modified [place]    LB Dressing Minimal Assist   Donned/ doffed socks  Modified [place]    Bathing Moderate Assist   Stand by Assist    Toileting Minimal Assist   Modified [place]    Bed Mobility  Supine to sit: NT   Sit to supine: NT  Supine to sit: Mod I   Sit to supine: Mod I   Functional Transfers Sit to stand: SBA  Stand to sit: SBA  Toilet Transfer: Min A  Mod I/ I using LRD   Functional Mobility Min A  Completed chair<>bathroom functional mobility without device  Mod / I using LRD   Balance Sitting:     Static:SBA    Dynamic:SBA  Standing: Min A     Activity Tolerance Fair-  Fair+   Visual/  Perceptual Glasses: yes         Safety       Fair                  Good     Upper Extremities:    Treatment Status  Date: Short Term Goals=LTG   B UE UE ROM:   R shoulder active ROM: ~0-90 degrees  L shoulder active ROM: ~0-120 degrees  B elbow-hand active ROM: WFL      Strength: RUE:  4-/5 LUE:  4-/5     Strength: B: 4/5                     Coordination Fine Motor Coordination:  Slight impairment  Opposition:slight impairment, patient demonstrates signs of arthritis in B hands      Gross Motor Coordination:   WFL                     Hand dominance: R handed    Hearing: WFL  Sensation:  No c/o numbness or tingling  Tone:  WFL  Edema: none observed B UE                            Comments:  Upon arrival, patient sitting in chair and agreeable to session. OT evaluation performed with education provided regarding the purpose and benefits of OT session, along with mobility and I/ADL completion. Patient demonstrates decreased activity tolerance with ADLs/ functional mobility. Education provided regarding energy conservation techniques as they relate to ADLs/ functional activities. Patient verbalized good understanding. At end of session, patient sitting in chair with call light and phone within reach, along with all lines and tubes intact. Nursing notified. Treatment: OT treatment provided this date includes:    Mobility training-  Instruction/training on safety and improved independence.  Facilitated functional mobility chair<>bathroom without device. SBA sit<>stand from chair and min A for toilet transfer, along with functional mobility. Patient demonstrated one LOB in bathroom with min A to correct and maintain balance. Verbal cues for safety and navigation.   Skilled monitoring of vitals-  Skilled monitoring of the patient's response throughout treatment. Vitals: On 4 L O2  Sitting in chair at rest: O2 sat 98%, HR 90 bpm  Upon completion of LB dressing: O2 sat 92%, HR 90 bpm  Upon completion of functional mobility: O2 sat 92%, HR 86 bpm          Patient would  benefit from continued skilled OT  to maximize functional outcomes as they relate to I/ADLs and functional mobility. Eval Complexity: Mod  · Evaluation Complexity: Mod Complexity  ? History: Expanded review of medical records and additional review of physical, cognitive, or psychosocial history related to current functional performance  ? Exam: 5+ performance deficits  ? Assistance/Modification: mod/max assistance or modifications required to perform tasks. May have comorbidities that affect occupational performance.       Assessment of current deficits   Functional mobility [x]  ADLs [x] Strength [x]  Cognition []  Functional transfers  [x] IADLs [x] Safety Awareness [x]  Endurance/Activity tolerance [x]  Fine Motor Coordination [] Balance [x] Vision/perception [] Sensation []   Gross Motor Coordination [] ROM [x] Delirium []                  Motor Control []    Plan of Care:  OT 1-3x/week for 1-2 weeks PRN   [x] ADL retraining/AE recommendations to maximize patient safety and performance with self-care   [x] Energy Conservation Techniques/Strategies      [] Neuromuscular Re-Education     [x] Functional Transfer Training to maximize safety utilizing LRD          [x] Functional Mobility Training to maximize safety utilizing LRD       [] Cognitive Re-Training         [] Splinting/Positioning Needs           [x] Therapeutic Activity to improve activity tolerance for functional activities and ADLs    [x]Therapeutic Exercise to improve UE strength for functional transfers and ADLs   [] Visual/Perceptual   [x] Delirium Prevention/Treatment to maximize functional outcomes  [x] Positioning to Improve Functional [place], Safety, and Skin Integrity   [x] Patient and/or Family Education to Increase Safety and Functional [place]   [] Other:     Rehab Potential: Good for established goals    Patient / Family Goal: To return home     Evaluation time includes thorough review of current medical information, gathering information on past medical & social history & PLOF, completion of standardized testing, informal observation of tasks, consultation with other medical professions/disciplines, assessment of data & development of POC/goals. Patient and/or family were instructed diagnosis, prognosis/goals and plan of care. yes Demonstrated good understanding. Time In: 10:26  Time Out: 10: 50  Total Treatment Time: 24 minutes   Min Units   OT Eval Low 51194     OT Eval Medium 97166 X 1   OT Eval High 25916     OT Re-Eval [id number]     Therapeutic Ex 30988     Therapeutic Activities 68544 10 1   ADL/Self Care 50278     Orthotic Management 01995     Neuro Re-Ed 81827     Non-Billable Time     TOTAL TIMED TREATMENT       [] Malnutrition indicators have been identified and nursing has been notified to ensure a dietitian consult is ordered.       Mod OT Evaluation + 10  treatment minutes    Beau Morning, OTR/L #[id number]

## 2020-12-22 VITALS
HEART RATE: 120 BPM | BODY MASS INDEX: 24.2 KG/M2 | OXYGEN SATURATION: 91 % | TEMPERATURE: 97.2 F | HEIGHT: 70 IN | RESPIRATION RATE: 20 BRPM | WEIGHT: 169 LBS | SYSTOLIC BLOOD PRESSURE: 140 MMHG | DIASTOLIC BLOOD PRESSURE: 84 MMHG

## 2020-12-22 LAB
METER GLUCOSE: 164 MG/DL (ref 74–99)
METER GLUCOSE: 170 MG/DL (ref 74–99)
METER GLUCOSE: 176 MG/DL (ref 74–99)

## 2020-12-22 PROCEDURE — 6370000000 HC RX 637 (ALT 250 FOR IP): Performed by: NURSE PRACTITIONER

## 2020-12-22 PROCEDURE — 2700000000 HC OXYGEN THERAPY PER DAY

## 2020-12-22 PROCEDURE — 82962 GLUCOSE BLOOD TEST: CPT

## 2020-12-22 PROCEDURE — 6360000002 HC RX W HCPCS: Performed by: NURSE PRACTITIONER

## 2020-12-22 RX ORDER — DEXAMETHASONE 6 MG/1
6 TABLET ORAL
Qty: 10 TABLET | Refills: 0 | Status: SHIPPED | OUTPATIENT
Start: 2020-12-22 | End: 2021-01-01

## 2020-12-22 RX ORDER — BENZONATATE 100 MG/1
100 CAPSULE ORAL EVERY 8 HOURS PRN
Qty: 21 CAPSULE | Refills: 0 | Status: SHIPPED | OUTPATIENT
Start: 2020-12-22 | End: 2020-12-29

## 2020-12-22 RX ORDER — ALBUTEROL SULFATE 90 UG/1
2 AEROSOL, METERED RESPIRATORY (INHALATION) EVERY 6 HOURS
Qty: 1 INHALER | Refills: 3 | Status: SHIPPED | OUTPATIENT
Start: 2020-12-22

## 2020-12-22 RX ADMIN — ALBUTEROL SULFATE 2 PUFF: 108 INHALANT RESPIRATORY (INHALATION) at 04:10

## 2020-12-22 RX ADMIN — OXYCODONE HYDROCHLORIDE AND ACETAMINOPHEN 500 MG: 500 TABLET ORAL at 08:26

## 2020-12-22 RX ADMIN — Medication 5000 UNITS: at 08:26

## 2020-12-22 RX ADMIN — INSULIN LISPRO 1 UNITS: 100 INJECTION, SOLUTION INTRAVENOUS; SUBCUTANEOUS at 12:27

## 2020-12-22 RX ADMIN — DEXAMETHASONE SODIUM PHOSPHATE 6 MG: 10 INJECTION INTRAMUSCULAR; INTRAVENOUS at 08:27

## 2020-12-22 RX ADMIN — ASPIRIN 325 MG: 325 TABLET, FILM COATED ORAL at 08:26

## 2020-12-22 RX ADMIN — Medication 50 MG: at 08:26

## 2020-12-22 RX ADMIN — ALBUTEROL SULFATE 2 PUFF: 108 INHALANT RESPIRATORY (INHALATION) at 15:03

## 2020-12-22 RX ADMIN — FENOFIBRATE 160 MG: 160 TABLET ORAL at 08:26

## 2020-12-22 RX ADMIN — INSULIN LISPRO 1 UNITS: 100 INJECTION, SOLUTION INTRAVENOUS; SUBCUTANEOUS at 06:18

## 2020-12-22 RX ADMIN — Medication 1 TABLET: at 08:26

## 2020-12-22 RX ADMIN — ALBUTEROL SULFATE 2 PUFF: 108 INHALANT RESPIRATORY (INHALATION) at 08:45

## 2020-12-22 RX ADMIN — PANTOPRAZOLE SODIUM 40 MG: 40 TABLET, DELAYED RELEASE ORAL at 06:15

## 2020-12-22 RX ADMIN — METOPROLOL TARTRATE 50 MG: 50 TABLET, FILM COATED ORAL at 08:26

## 2020-12-22 RX ADMIN — INSULIN LISPRO 1 UNITS: 100 INJECTION, SOLUTION INTRAVENOUS; SUBCUTANEOUS at 16:08

## 2020-12-22 ASSESSMENT — PAIN SCALES - GENERAL: PAINLEVEL_OUTOF10: 0

## 2020-12-22 NOTE — CARE COORDINATION
Received call from daughter-Corinna, she wants pt to go home with her due to pt's wife in hospital and doesn't want pt to be home alone. Address is [address] [place] [address] [address] [place] [address]  Phone # [phone]. Notified Jw Manzano. Daughter to transport home once O2 delivered. Referral to [facility]). Mariya TORRES

## 2020-12-22 NOTE — CARE COORDINATION
Pt choiced for oxygen; chose [facility]; referral made to [address] [place] [address]; ETA between 1-3 for hospital oxygen delivery. The Plan for Transition of Care is related to the following treatment goals: medical stability    The Patient and/or patient was provided with a choice of provider and agrees   with the discharge plan. [x] Yes [] No    Freedom of choice list was provided with basic dialogue that supports the patient's individualized plan of care/goals, treatment preferences and shares the quality data associated with the providers.  [x] Yes [] No

## 2020-12-22 NOTE — DISCHARGE SUMMARY
Physician Discharge Summary     Patient ID:  Daniela Bañuelos  [id number]  82 y.o.  1935    Admit date: 12/19/2020    Discharge date and time: 12/22/2020    Admission Diagnoses:   Patient Active Problem List   Diagnosis    Hypertension    Hyperlipidemia    [facility]    Closed nondisplaced fracture of right clavicle    Acute respiratory failure with hypoxia ([facility]    COVID-19    COVID-19 virus infection    Type 2 diabetes mellitus, without long-term current use of insulin (HCC)    CARLOS on CPAP    CAD (coronary artery disease)    Elevated serum creatinine    Anemia    Hyponatremia    Gastroesophageal reflux disease without esophagitis       Discharge Diagnoses: COVID-19 pneumonia    Consults: none    Procedures: None    Hospital Course: Admit to telemetry for evaluation of worsening symptoms and patient was tested approximately 10 days ago for SARS-CoV-2 and was positive  Already initiated on remdesivir and Decadron-received 4 days of remdesivir, transition to p.o. Decadron on discharge  tolerating p.o. intake  Covid from 12/15 is positive: 12/20 SARS-CoV-2 negative   anti-inflammatory markers every other day  1 out of 4 blood cultures positive for GPC in clusters-CO NS   asymptomatic.   No fevers no white count  Supportive care  Check lactic acid and procalcitonin-if negative patient can be discharged later today as I think the blood cultures are contaminant     Ambulate to check pulse ox-maintain saturations of 90% on 4 L of oxygen  DC home on oxygen    Recent Labs     12/19/20  1856 12/20/20  0600   WBC 3.6* 2.2*   HGB 11.2* 11.5*   HCT 31.7* 34.2*    252       Recent Labs     12/19/20  1856 12/20/20  0600   * 135   K 4.2 4.2   CL 96* 102   CO2 19* 21*   BUN 24* 26*   CREATININE 1.5* 1.5*   CALCIUM 9.3 9.3       Cta Pulmonary W Contrast    Result Date: 12/19/2020  EXAMINATION: CTA OF THE CHEST 12/19/2020 8:48 pm TECHNIQUE: CTA of the chest was performed after the administration of intravenous contrast.  Multiplanar reformatted images are provided for review. MIP images are provided for review. Dose modulation, iterative reconstruction, and/or weight based adjustment of the mA/kV was utilized to reduce the radiation dose to as low as reasonably achievable. COMPARISON: Chest radiograph performed 12/16/2020 HISTORY: ORDERING SYSTEM PROVIDED HISTORY: SOB What reading provider will be dictating this exam?-> CRC diagnosed approximately 10 days ago with COVID-19 FINDINGS: Pulmonary Arteries: Pulmonary arteries are adequately opacified for evaluation. No evidence of intraluminal filling defect to suggest pulmonary embolism. Main pulmonary artery is normal in caliber, 27 mm. Mediastinum: No evidence of mediastinal lymphadenopathy. Mild cardiomegaly. Calcific atherosclerosis aorta and coronary arteries, status post CABG. There is no acute abnormality of the thoracic aorta. The ascending thoracic aorta is 35 mm diameter and descending thoracic aorta 32 mm diameter. Lungs/pleura: The lungs are without acute process. No focal consolidation or pulmonary edema. No evidence of pleural effusion or pneumothorax. Upper Abdomen: Limited images of the upper abdomen are unremarkable. Soft Tissues/Bones: No acute bone or soft tissue abnormality. 1. No pulmonary embolism. 2. Diffuse pulmonary infiltrates throughout the bilateral lungs typical for insert COVID, mild pulmonary disease based on approximate percentage of pulmonary parenchymal involvement. Note that CT pulmonary findings of COVID-19 show overlap with other disease entities including H1N1 influenza, other viral pneumonia (i.e. adenovirus, CMV), organizing pneumonia, alveolar proteinosis and acute interstitial pneumonitis. 3. Mild cardiomegaly. 4. Calcific atherosclerosis, status post CABG. Discharge Exam:    HEENT: NCAT,  PERRLA, No JVD  Heart:  RRR, no murmurs, gallops, or rubs.   Lungs:  CTA bilaterally, no wheeze, rales or rhonchi  Abd: bowel sounds present, nontender, nondistended, no masses  Extrem:  No clubbing, cyanosis, or edema    Disposition: home     Patient Condition at Discharge: Stable    Patient Instructions:      Medication List      START taking these medications    albuterol sulfate  (90 Base) MCG/ACT inhaler  Inhale 2 puffs into the lungs every 6 hours     benzonatate 100 MG capsule  Commonly known as: TESSALON  Take 1 capsule by mouth every 8 hours as needed for Cough        CONTINUE taking these medications    aspirin 325 MG tablet     atorvastatin 20 MG tablet  Commonly known as: LIPITOR     CPAP Machine Misc     fenofibrate 160 MG tablet  Commonly known as: TRIGLIDE     metFORMIN 500 MG tablet  Commonly known as: GLUCOPHAGE     metoprolol tartrate 50 MG tablet  Commonly known as: LOPRESSOR     multivitamin per tablet     omeprazole 20 MG delayed release capsule  Commonly known as: PRILOSEC     Vitamin D3 125 MCG (4789 UT) Tabs           Where to Get Your Medications      These medications were sent to [facility]/pharmacy #[id number]- [place] [address] - P [phone] - F [phone]  [address] [address]    Phone: [phone]   · albuterol sulfate  (90 Base) MCG/ACT inhaler  · benzonatate 100 MG capsule       Activity: activity as tolerated  Diet: regular diet    Pt has been advised to: Follow-up with Michael Raman DO in 1 week.   Follow-up with consultants as recommended by them    Note that over 30 minutes was spent in preparing discharge papers, discussing discharge with patient, medication review, etc.    Signed:  Lisa Rios MD  12/22/2020  11:56 AM

## 2020-12-23 ENCOUNTER — CARE COORDINATION (OUTPATIENT)
Dept: CASE MANAGEMENT | Age: 85
End: 2020-12-23

## 2020-12-23 NOTE — PROGRESS NOTES
Physician Progress Note      PATIENT:               Yoli Alfred  CSN #:                  [id number]  :                       1935  ADMIT DATE:       2020 6:31 PM  [address] DATE:        2020 6:07 PM  RESPONDING  PROVIDER #:        DIANE Watson MD          QUERY TEXT:    Pt admitted with COVID-19 and noted to have WBC 2.2, CRP 9.7, Temperature   102.9, with documented Acute Respiratory Failure. If possible, please   document in progress notes and discharge summary if you are evaluating and/or   treating: The medical record reflects the following:  Risk Factors: COVID-19  Clinical Indicators: Per notes, \"COVID-19 virus infection, Acute respiratory   failure with hypoxia. All imaging personally reviewed-CTA chest with diffuse   pulmonary infiltrates throughout bilateral lungs consistent with COVID-19   pneumonia\". Resulted labs and vitals: WBC 3.6, 2.2. CRP 9.7. Temperature 102.9   on admission. Currently on 4L oxygen. Treatment: Decadron, Remdesivir, labs, vitals, diagnostics, oxygen    Thank you,  Lexie Hooper, RN, BSN, Clinical Documentation Improvement  Options provided:  -- Sepsis due to COVID-19 POA  -- Sepsis due to COVID-19 not POA  -- COVID-19 without sepsis  -- Other - I will add my own diagnosis  -- Disagree - Not applicable / Not valid  -- Disagree - Clinically unable to determine / Unknown  -- Refer to Clinical Documentation Reviewer    PROVIDER RESPONSE TEXT:    This patient has COVID-19 without sepsis.     Query created by: Nargis Doan on 2020 10:05 AM      Electronically signed by:  DIANE Watson MD 2020 10:06 AM

## 2020-12-23 NOTE — CARE COORDINATION
[facility] Transitions Initial Follow Up Call    Call within 2 business days of discharge: Yes    Patient: Alondra Olivares Patient : 1935   MRN: [id number]  Reason for Admission: Acute respiratory failure with hypoxia  Discharge Date: 20 RARS: Readmission Risk Score: 20      Last Discharge [facility]       Complaint Diagnosis Description Type Department Provider    20 Shortness of Breath COVID-19 virus infection . .. ED to Hosp-Admission (Discharged) (ADMITTED) Ron Mi MD; Reyes Nearing, DO           Spoke with: No one    [facility] [place] [facility]    First attempt to reach the patient for initial BPCI 177) Care Transition and Covid-19 Monitoring call post hospital discharge. Message left on home phone with CTN's contact information requesting return phone call. Voicemail box not set up on mobile phone. Follow Up  No future appointments.     Lakshmi Ocampo RN

## 2020-12-24 ENCOUNTER — CARE COORDINATION (OUTPATIENT)
Dept: CASE MANAGEMENT | Age: 85
End: 2020-12-24

## 2020-12-24 NOTE — CARE COORDINATION
Melania 45 Transitions Initial Follow Up Call    Call within 2 business days of discharge: Yes    Patient: Segundo Ayala Patient : 1935   MRN: [id number]  Reason for Admission: Acute respiratory failure with hypoxia  Discharge Date: 20 RARS: Readmission Risk Score: 20      Last Discharge [facility]       Complaint Diagnosis Description Type Department Provider    20 Shortness of Breath COVID-19 virus infection . .. ED to Hosp-Admission (Discharged) (ADMITTED) Linda Brownlee MD; Dania Agustin DO           Spoke with: No one    [place] [facility] [place]    Second attempt to reach the patient for initial BPCI [phone]) Care Transition and Covid-19 Monitoring call post hospital discharge. Message left on home phone with CTN's contact information requesting return phone call. Unable to leave vm on mobile phone as vm box is not set up yet. This CTN to hand off to [employer].      Follow Up  No future appointments.     Betsy Mcdowell RN

## 2020-12-26 LAB
BLOOD CULTURE, ROUTINE: ABNORMAL
BLOOD CULTURE, ROUTINE: ABNORMAL
ORGANISM: ABNORMAL
ORGANISM: ABNORMAL

## 2020-12-27 LAB
CULTURE, BLOOD 2: ABNORMAL
CULTURE, BLOOD 2: ABNORMAL
ORGANISM: ABNORMAL

## 2020-12-31 ENCOUNTER — CARE COORDINATION (OUTPATIENT)
Dept: CARE COORDINATION | Age: 85
End: 2020-12-31

## 2020-12-31 NOTE — CARE COORDINATION
Mel[facility] 45 Transitions Follow Up Call    2020    Patient: Sp Ray  Patient : 1935   MRN: <[id number]>  Reason for Admission:   Discharge Date: 20 RARS: Readmission Risk Score: 20         Spoke with: Wife    Patient's wife states patient is doing much better. No C/O SOB, wheezing, cough, chest pain, fatigue, fever, appetite good. No oxygen use at this time. Patient has all medications is taking medications as directed and has no questions concerning medications at this time. Explained the BPCI-A program and that the patient is followed for 90 days after discharge. Expresses understanding. Patient / family is aware of when to contact MD with any new or worsening symptoms. Will continue to follow. Shanel Mooney LPN    [phone]  Monalisa Bender / [address] [place] [address] Transitions Subsequent and Final Call    Subsequent and Final Calls  Do you have any ongoing symptoms?: No  Have your medications changed?: No  Do you have any questions related to your medications?: No  Do you currently have any active services?: No  Do you have any needs or concerns that I can assist you with?: No  Identified Barriers: None  Care Transitions Interventions  Other Interventions: Follow Up  No future appointments.     Shanel Mooney LPN

## 2021-01-19 ENCOUNTER — CARE COORDINATION (OUTPATIENT)
Dept: CASE MANAGEMENT | Age: 86
End: 2021-01-19

## 2021-02-02 ENCOUNTER — CARE COORDINATION (OUTPATIENT)
Dept: CASE MANAGEMENT | Age: 86
End: 2021-02-02

## 2021-02-02 NOTE — CARE COORDINATION
Mel[facility] 45 Transitions Follow Up Call    2021    Patient: Guillermina Thompson  Patient : 1935   MRN: [id number]  Reason for Admission:   Discharge Date: 20 RARS: Readmission Risk Score: 20         Spoke with: Patient, Johan Desir Transitions Subsequent and Final Call    Subsequent and Final Calls  Do you have any ongoing symptoms?: Yes  Patient-reported symptoms: Shortness of Breath, Cough  -patient reports shortness of breath is at baseline; SpO2 94%-96% RA  -reports occasional productive cough and expectorating clear mucus  Do you have any questions related to your medications?: No  Do you currently have any active services?: Yes  -PT visit today  Do you have any needs or concerns that I can assist you with?: No  Identified Barriers: None  Care Transitions Interventions  No Identified Needs    Patient reports he is getting better day by day. -reports good appetite   -normal bladder/bowel elimination  -ambulates independently; reports feeling stronger and more confident when ambulating  -taking prescribed medications as ordered     Emotional support provided; will continue to follow. Follow Up  No future appointments.     Raudel Kern RN

## 2021-02-16 ENCOUNTER — CARE COORDINATION (OUTPATIENT)
Dept: CASE MANAGEMENT | Age: 86
End: 2021-02-16

## 2021-02-16 NOTE — CARE COORDINATION
[facility] 45 Transitions Follow Up Call    2021    Patient: Lydia Villa  Patient : 1935   MRN: [id number]  Reason for Admission:   Discharge Date: 20 RARS: Readmission Risk Score: 20      Attempted to reach patient by phone regarding follow up; BPCI-A. Unable to reach patient; no answer, no voice mailbox set up. Will attempt outreach at a later time.         Noelle Escobar RN

## 2021-02-23 ENCOUNTER — CARE COORDINATION (OUTPATIENT)
Dept: CASE MANAGEMENT | Age: 86
End: 2021-02-23

## 2021-02-23 NOTE — CARE COORDINATION
Mel[facility] 45 Transitions Follow Up Call    2021    Patient: Salima Hancock  Patient : 1935   MRN: <[id number]>  Reason for Admission: COVID-19 PNA  Discharge Date: 20 RARS: Readmission Risk Score: 20         Spoke with: Pt's wife    Pt's wife stated Delfino Butler is sleeping, has been doing well. Stated he got his second COVID-19 vaccine today and so far has not has any side effects. No immediate needs or concerns. Care Transitions Subsequent and Final Call    Subsequent and Final Calls  Care Transitions Interventions  Other Interventions: Follow Up  No future appointments.     Tian Baen RN

## 2021-03-09 ENCOUNTER — CARE COORDINATION (OUTPATIENT)
Dept: CASE MANAGEMENT | Age: 86
End: 2021-03-09

## 2021-03-09 NOTE — CARE COORDINATION
Mel[facility] 45 Transitions Follow Up Call    3/9/2021    Patient: Jewell Holcomb  Patient : 1935   MRN: <[id number]>  Reason for Admission:   Discharge Date: 20 RARS: Readmission Risk Score: 20         Spoke with: Berkley Quispe, patient    Care Transitions Subsequent and Final Call    Subsequent and Final Calls  Do you have any ongoing symptoms?: Yes  Patient-reported symptoms: Shortness of Breath, Weakness  Have your medications changed?: No  Do you have any questions related to your medications?: No  Do you currently have any active services?: No  Do you have any needs or concerns that I can assist you with?: No  Identified Barriers: None  Care Transitions Interventions  Other Interventions: Follow Up:  Received return phone call from patient. Mr. Jayce Rooney stated he is doing fine. Reports he is 95% back to his normal self. He stated he is still \"a little\" weak and tired but doing most of his normal activities. He reports he may become short of breath when exerting himself but most times he is trying to take it easy. No c/o chest pain/discomfort, pressure, tightness, cough, fever, chills. Reports his sense of taste and smell has returned. No questions or concerns at this time. Will continue to follow. No future appointments.     Yves Angela RN

## 2021-03-19 ENCOUNTER — CARE COORDINATION (OUTPATIENT)
Dept: CASE MANAGEMENT | Age: 86
End: 2021-03-19

## 2021-03-19 NOTE — CARE COORDINATION
Mel[facility] 45 Transitions Follow Up Call    3/19/2021    Patient: Samantha Way  Patient : 1935   MRN: <[id number]>  Reason for Admission:   Discharge Date: 20 RARS: Readmission Risk Score: 20         Spoke with: Lianet Guo, patient    Contacted patient for final BPCI-A follow up. Mr. Eliseo Cruz state that he is doing fine. He denies having any c/o chest pain/discomfort, increased shortness of breath, cough, fever, chills. Reports he get \"winded\" going up stairs. Taking rest periods as needed. He stated that he is back to doing his normal activities and chores. Reports he saw his doctor the other day. Discussed when to contact provider with any new or worsening symptoms. He verbalized understanding. He confirmed he has all of his medications and taking them as ordered. Informed him that this will be the final follow up call. He does not have any questions or concerns for CTN at this time. Care Transitions Subsequent and Final Call    Subsequent and Final Calls  Do you have any ongoing symptoms?: Yes  Patient-reported symptoms: Shortness of Breath  Have your medications changed?: No  Do you have any questions related to your medications?: No  Do you currently have any active services?: No  Do you have any needs or concerns that I can assist you with?: No  Identified Barriers: None  Care Transitions Interventions  Other Interventions: Follow Up  No future appointments.     Blair Spann RN

## 2021-10-12 ENCOUNTER — OFFICE VISIT (OUTPATIENT)
Dept: CARDIOLOGY CLINIC | Age: 86
End: 2021-10-12
Payer: MEDICARE

## 2021-10-12 VITALS
RESPIRATION RATE: 16 BRPM | OXYGEN SATURATION: 97 % | WEIGHT: 188.8 LBS | HEART RATE: 67 BPM | SYSTOLIC BLOOD PRESSURE: 138 MMHG | BODY MASS INDEX: 27.96 KG/M2 | DIASTOLIC BLOOD PRESSURE: 66 MMHG | HEIGHT: 69 IN

## 2021-10-12 DIAGNOSIS — R06.09 DOE (DYSPNEA ON EXERTION): Primary | ICD-10-CM

## 2021-10-12 DIAGNOSIS — I25.10 CORONARY ARTERY DISEASE INVOLVING NATIVE CORONARY ARTERY OF NATIVE HEART WITHOUT ANGINA PECTORIS: ICD-10-CM

## 2021-10-12 DIAGNOSIS — N18.32 STAGE 3B CHRONIC KIDNEY DISEASE (HCC): ICD-10-CM

## 2021-10-12 DIAGNOSIS — Z95.1 S/P CABG X 2: ICD-10-CM

## 2021-10-12 DIAGNOSIS — I10 PRIMARY HYPERTENSION: ICD-10-CM

## 2021-10-12 PROCEDURE — G8417 CALC BMI ABV UP PARAM F/U: HCPCS | Performed by: INTERNAL MEDICINE

## 2021-10-12 PROCEDURE — 99204 OFFICE O/P NEW MOD 45 MIN: CPT | Performed by: INTERNAL MEDICINE

## 2021-10-12 PROCEDURE — 93000 ELECTROCARDIOGRAM COMPLETE: CPT | Performed by: INTERNAL MEDICINE

## 2021-10-12 PROCEDURE — G8484 FLU IMMUNIZE NO ADMIN: HCPCS | Performed by: INTERNAL MEDICINE

## 2021-10-12 PROCEDURE — 1036F TOBACCO NON-USER: CPT | Performed by: INTERNAL MEDICINE

## 2021-10-12 PROCEDURE — 1123F ACP DISCUSS/DSCN MKR DOCD: CPT | Performed by: INTERNAL MEDICINE

## 2021-10-12 PROCEDURE — G8427 DOCREV CUR MEDS BY ELIG CLIN: HCPCS | Performed by: INTERNAL MEDICINE

## 2021-10-12 PROCEDURE — 4040F PNEUMOC VAC/ADMIN/RCVD: CPT | Performed by: INTERNAL MEDICINE

## 2021-10-12 RX ORDER — FENOFIBRATE 54 MG/1
54 TABLET ORAL DAILY
COMMUNITY
Start: 2021-09-29

## 2021-10-12 RX ORDER — METOPROLOL TARTRATE 50 MG/1
50 TABLET, FILM COATED ORAL 2 TIMES DAILY
Qty: 180 TABLET | Refills: 3 | Status: SHIPPED
Start: 2021-10-12 | End: 2022-09-30

## 2021-10-12 RX ORDER — ATORVASTATIN CALCIUM 40 MG/1
40 TABLET, FILM COATED ORAL DAILY
Qty: 90 TABLET | Refills: 3 | Status: SHIPPED
Start: 2021-10-12 | End: 2022-09-30

## 2021-10-12 RX ORDER — ASPIRIN 81 MG/1
81 TABLET, CHEWABLE ORAL DAILY
Qty: 90 TABLET | Refills: 3 | Status: SHIPPED
Start: 2021-10-12 | End: 2022-09-30

## 2021-10-12 RX ORDER — FAMOTIDINE 20 MG/1
20 TABLET, FILM COATED ORAL DAILY
COMMUNITY
Start: 2021-09-29

## 2021-10-12 NOTE — PROGRESS NOTES
[address] [facility]   Heart and Vascular [place]   Clinic Note     Date:10/12/21   Patient Name:Júnior Thacker  YOB: 1935  Age: 82 y.o. Primary Care Provider: DO Anabel Fitzgerald     30-year-old  male last seen by Dr. Tony Madison in October 2019. He is accompanied by his wife    Since he recovered from COVID-19 pneumonia he has had some balance issues and feels short of breath going up and down steps but not on level ground. He denies chest discomfort. He has had occasional lower extremity edema but no orthopnea or PND. He has had occasional palpitations but no syncope or presyncope. A focused history review includes:  1. CAD with acute coronary syndrome status post unsuccessful PCI in 1998 requiring emergent CABG with LIMA to LAD, SVG to RCA (Dr. Alyse Gaines at [facility])  a. Sternal malunion requiring reintervention ~2000  2. TIA late 1990s  3. Type 2 diabetes on orals  4. 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 Tahmina Taylor  5. Hypertension  6. Hyperlipidemia  7. Former smoker 10 pack years-quit 1964  8. CARLOS on CPAP  9.  GERD  10. COVID-19 pneumonia December 2020 - 4G Tiffany negative      Past History    Past Medical History:         Diagnosis Date    ACS (acute coronary syndrome) ([facility] [address] 05/01/1998    CAD (coronary artery disease)     COVID-19 12/15/2020    Hyperlipidemia     Hypertension     Sleep apnea     Stroke ([facility] [address]     Type II or unspecified type diabetes mellitus without mention of complication, not stated as uncontrolled        Past Surgical History:  Past Surgical History:   Procedure Laterality Date    CHOLECYSTECTOMY      CORONARY ARTERY BYPASS GRAFT  1998    LIMA-LAD, SVG-RCA    TOTAL KNEE ARTHROPLASTY  2000       Social History:    Social History     Tobacco History     Smoking Status  Former Smoker Quit date  1/1/1964 Smoking Frequency  1 pack/day for 10 years (10 pk yrs) Smoking Tobacco Type  Cigarettes    Smokeless Tobacco Use  Never Used          Alcohol History     Alcohol Use Status  Not Currently Comment  none since 1972          Drug Use     Drug Use Status  Never          Sexual Activity     Sexually Active  Not Asked                    Family History:-      Problem Relation Age of Onset    No Known Problems Sister     No Known Problems Sister     No Known Problems Sister     No Known Problems Brother          Review of Systems   Negative except as in HPI        Medications     Current Outpatient Medications   Medication Sig Dispense Refill    fenofibrate (TRICOR) 54 MG tablet Take 54 mg by mouth daily       famotidine (PEPCID) 20 MG tablet Take 20 mg by mouth daily       metoprolol tartrate (LOPRESSOR) 50 MG tablet Take 1 tablet by mouth 2 times daily 180 tablet 3    atorvastatin (LIPITOR) 40 MG tablet Take 1 tablet by mouth daily 90 tablet 3    aspirin (ASPIRIN CHILDRENS) 81 MG chewable tablet Take 1 tablet by mouth daily 90 tablet 3    albuterol sulfate  (90 Base) MCG/ACT inhaler Inhale 2 puffs into the lungs every 6 hours 1 Inhaler 3    Cholecalciferol (VITAMIN D3) 5000 units TABS Take 5,000 Units by mouth daily      CPAP Machine MISC by Does not apply route nightly       metformin (GLUCOPHAGE) 500 MG tablet Take 500 mg by mouth 2 times daily (with meals).  multivitamin (THERAGRAN) per tablet Take 1 tablet by mouth daily. No current facility-administered medications for this visit. Physical Examination      /66 (Site: Right Upper Arm, Position: Sitting, Cuff Size: Medium Adult)   Pulse 67   Resp 16   Ht 5' 9\" (1.753 m)   Wt 188 lb 12.8 oz (85.6 kg)   SpO2 97%   BMI 27.88 kg/m²   Body surface area is 2.04 meters squared.      General: No acute distress, appears as stated age, nonicteric  Head: Atraumatic, no gross abnormalities or bruises  Neck: Supple and nontender, no carotid bruits, mildly elevated JVP  Lungs: Clear to auscultation bilaterally, no wheezes, rales, or rhonchi  Heart: Regular rate and rhythm. 2/6 systolic ejection murmur over the upper right sternal border  Abdomen: Soft, nontender, nondistended, normal bowel sounds  Extremities: No obvious deformities, no cyanosis, no pitting edema  Neurological: Alert and oriented x3, EOMI, moving all extremities x4  Psychological: Normal mood and affect, cooperative  Skin: Color, texture, and turgor normal for age          Labs/Imaging/Diagnostics   Personally reviewed:    Lab Results   Component Value Date     12/20/2020    K 4.2 12/20/2020     12/20/2020    CO2 21 12/20/2020    BUN 26 12/20/2020    CREATININE 1.5 12/20/2020    GLUCOSE 161 12/20/2020    CALCIUM 9.3 12/20/2020        CrCl cannot be calculated (Patient's most recent lab result is older than the maximum 120 days allowed. ). Lab Results   Component Value Date    WBC 2.2 (L) 12/20/2020    HGB 11.5 (L) 12/20/2020    HCT 34.2 (L) 12/20/2020    MCV 95.8 12/20/2020     12/20/2020       Lab Results   Component Value Date    ALT 35 12/16/2020    AST 86 (H) 12/16/2020    ALKPHOS 46 12/16/2020    BILITOT 0.8 12/16/2020       Lab Results   Component Value Date    LABALBU 3.7 12/16/2020         Personally interpreted the following studies.:  EKG: Today  Normal sinus rhythm with left anterior fascicular block, IVCD, LVH and small septal Q waves        Assessment and Plan:        72-year-old  male with NYHA class II dyspnea on exertion and borderline volume status but no chest pain. He has occasional palpitations as well. He has not had a TTE previously in our records. Diagnosis Orders   1. LACKEY (dyspnea on exertion)     2. Coronary artery disease involving native coronary artery of native heart without angina pectoris  EKG 12 Lead    ECHO COMPLETE    CBC WITH AUTO DIFFERENTIAL    BASIC METABOLIC PANEL    LIPID PANEL    BRAIN NATRIURETIC PEPTIDE   3. S/P CABG x 2     4. Primary hypertension     5.  Stage 3b chronic kidney disease ([facility] [address]  We will start with TTE.  CBC, BMP, NT proBNP, routine lipid panel   If no etiology is identified especially after he sees his pulmonologist then we will proceed with a 2-week ZIO XT patch to rule out PAF. If that also is unrevealing then we will perform a Lexiscan stress MPI.  Decrease aspirin to 81 mg daily   Increase metoprolol to twice daily   Increase atorvastatin 40 mg daily     Assessment and plan reviewed with the patient/family and their questions and concerns answered in full.  Follow up with me in 3 months    We appreciate the opportunity to participate in His care!       Daily Patel MD, [address] [facility]  Interventional Cardiology/Structural Heart Disease  Office: [phone]  Fax: [phone]  Office Coordinators: Belton Severs

## 2021-10-12 NOTE — PATIENT INSTRUCTIONS
1. Aspirin 81 mg once daily -chewable baby aspirin  2. Metoprolol 50 mg TWICE daily instead of once  3. Atorvastatin (Lipitor) should be 40 mg once daily  4. Echocardiogram (heart ultrasound)  5. Blood work - preferably fasting 12 hrs  6.  Return in 3 months

## 2021-11-23 ENCOUNTER — HOSPITAL ENCOUNTER (OUTPATIENT)
Age: 86
Discharge: HOME | End: 2021-11-23
Payer: MEDICARE

## 2021-11-23 LAB
ALBUMIN SERPL-MCNC: 4.7 G/DL (ref 3.5–5.2)
ALP BLD-CCNC: 84 U/L (ref 40–129)
ALT SERPL-CCNC: 25 U/L (ref 0–40)
ANION GAP SERPL CALCULATED.3IONS-SCNC: 13 MMOL/L (ref 7–16)
AST SERPL-CCNC: 33 U/L (ref 0–39)
BASOPHILS ABSOLUTE: 0.07 E9/L (ref 0–0.2)
BASOPHILS RELATIVE PERCENT: 1.2 % (ref 0–2)
BILIRUB SERPL-MCNC: 1.3 MG/DL (ref 0–1.2)
BILIRUBIN URINE: NEGATIVE
BLOOD, URINE: NEGATIVE
BUN BLDV-MCNC: 24 MG/DL (ref 6–23)
CALCIUM SERPL-MCNC: 10.4 MG/DL (ref 8.6–10.2)
CHLORIDE BLD-SCNC: 102 MMOL/L (ref 98–107)
CHOLESTEROL, TOTAL: 116 MG/DL (ref 0–199)
CLARITY: CLEAR
CO2: 24 MMOL/L (ref 22–29)
COLOR: YELLOW
CREAT SERPL-MCNC: 1.9 MG/DL (ref 0.7–1.2)
EOSINOPHILS ABSOLUTE: 0.23 E9/L (ref 0.05–0.5)
EOSINOPHILS RELATIVE PERCENT: 4 % (ref 0–6)
GFR AFRICAN AMERICAN: 41
GFR NON-AFRICAN AMERICAN: 34 ML/MIN/1.73
GLUCOSE BLD-MCNC: 124 MG/DL (ref 74–99)
GLUCOSE URINE: NEGATIVE MG/DL
HCT VFR BLD CALC: 38.9 % (ref 37–54)
HDLC SERPL-MCNC: 37 MG/DL
HEMOGLOBIN: 12.9 G/DL (ref 12.5–16.5)
IMMATURE GRANULOCYTES #: 0.02 E9/L
IMMATURE GRANULOCYTES %: 0.3 % (ref 0–5)
KETONES, URINE: NEGATIVE MG/DL
LDL CHOLESTEROL CALCULATED: 53 MG/DL (ref 0–99)
LEUKOCYTE ESTERASE, URINE: NEGATIVE
LYMPHOCYTES ABSOLUTE: 2.28 E9/L (ref 1.5–4)
LYMPHOCYTES RELATIVE PERCENT: 39.9 % (ref 20–42)
MAGNESIUM: 2.1 MG/DL (ref 1.6–2.6)
MCH RBC QN AUTO: 32.3 PG (ref 26–35)
MCHC RBC AUTO-ENTMCNC: 33.2 % (ref 32–34.5)
MCV RBC AUTO: 97.5 FL (ref 80–99.9)
MONOCYTES ABSOLUTE: 0.63 E9/L (ref 0.1–0.95)
MONOCYTES RELATIVE PERCENT: 11 % (ref 2–12)
NEUTROPHILS ABSOLUTE: 2.49 E9/L (ref 1.8–7.3)
NEUTROPHILS RELATIVE PERCENT: 43.6 % (ref 43–80)
NITRITE, URINE: NEGATIVE
PARATHYROID HORMONE INTACT: 21 PG/ML (ref 15–65)
PDW BLD-RTO: 13.2 FL (ref 11.5–15)
PH UA: 5 (ref 5–9)
PHOSPHORUS: 3.7 MG/DL (ref 2.5–4.5)
PLATELET # BLD: 229 E9/L (ref 130–450)
PMV BLD AUTO: 10.3 FL (ref 7–12)
POTASSIUM SERPL-SCNC: 5 MMOL/L (ref 3.5–5)
PRO-BNP: 480 PG/ML (ref 0–450)
PROTEIN UA: NEGATIVE MG/DL
RBC # BLD: 3.99 E12/L (ref 3.8–5.8)
SODIUM BLD-SCNC: 139 MMOL/L (ref 132–146)
SPECIFIC GRAVITY UA: >=1.03 (ref 1–1.03)
TOTAL PROTEIN: 8.1 G/DL (ref 6.4–8.3)
TRIGL SERPL-MCNC: 128 MG/DL (ref 0–149)
URIC ACID, SERUM: 5.8 MG/DL (ref 3.4–7)
UROBILINOGEN, URINE: 0.2 E.U./DL
VLDLC SERPL CALC-MCNC: 26 MG/DL
WBC # BLD: 5.7 E9/L (ref 4.5–11.5)

## 2021-11-23 PROCEDURE — 83880 ASSAY OF NATRIURETIC PEPTIDE: CPT

## 2021-11-23 PROCEDURE — 85025 COMPLETE CBC W/AUTO DIFF WBC: CPT

## 2021-11-23 PROCEDURE — 82306 VITAMIN D 25 HYDROXY: CPT

## 2021-11-23 PROCEDURE — 36415 COLL VENOUS BLD VENIPUNCTURE: CPT

## 2021-11-23 PROCEDURE — 81003 URINALYSIS AUTO W/O SCOPE: CPT

## 2021-11-23 PROCEDURE — 83735 ASSAY OF MAGNESIUM: CPT

## 2021-11-23 PROCEDURE — 83970 ASSAY OF PARATHORMONE: CPT

## 2021-11-23 PROCEDURE — 80053 COMPREHEN METABOLIC PANEL: CPT

## 2021-11-23 PROCEDURE — 84100 ASSAY OF PHOSPHORUS: CPT

## 2021-11-23 PROCEDURE — 84550 ASSAY OF BLOOD/URIC ACID: CPT

## 2021-11-23 PROCEDURE — 80061 LIPID PANEL: CPT

## 2021-11-24 LAB — VITAMIN D 25-HYDROXY: 70 NG/ML (ref 30–100)

## 2021-12-15 ENCOUNTER — TELEPHONE (OUTPATIENT)
Dept: CARDIOLOGY CLINIC | Age: 86
End: 2021-12-15

## 2021-12-15 NOTE — TELEPHONE ENCOUNTER
Left message to confirm  cancellation of 1-3-22 appointment and reschedule date 1-10-22 12 noon 11:30am arrival

## 2022-01-10 ENCOUNTER — OFFICE VISIT (OUTPATIENT)
Dept: CARDIOLOGY CLINIC | Age: 87
End: 2022-01-10
Payer: MEDICARE

## 2022-01-10 VITALS
RESPIRATION RATE: 18 BRPM | BODY MASS INDEX: 28.61 KG/M2 | HEART RATE: 57 BPM | HEIGHT: 69 IN | SYSTOLIC BLOOD PRESSURE: 150 MMHG | OXYGEN SATURATION: 98 % | DIASTOLIC BLOOD PRESSURE: 74 MMHG | WEIGHT: 193.2 LBS

## 2022-01-10 DIAGNOSIS — I25.10 CORONARY ARTERY DISEASE INVOLVING NATIVE CORONARY ARTERY OF NATIVE HEART WITHOUT ANGINA PECTORIS: Primary | ICD-10-CM

## 2022-01-10 DIAGNOSIS — Z86.16 PERSONAL HISTORY OF COVID-19: ICD-10-CM

## 2022-01-10 DIAGNOSIS — I10 PRIMARY HYPERTENSION: ICD-10-CM

## 2022-01-10 DIAGNOSIS — Z95.1 S/P CABG X 2: ICD-10-CM

## 2022-01-10 DIAGNOSIS — I35.2 NONRHEUMATIC AORTIC (VALVE) STENOSIS WITH INSUFFICIENCY: ICD-10-CM

## 2022-01-10 DIAGNOSIS — N18.32 STAGE 3B CHRONIC KIDNEY DISEASE (HCC): ICD-10-CM

## 2022-01-10 PROCEDURE — 4040F PNEUMOC VAC/ADMIN/RCVD: CPT | Performed by: INTERNAL MEDICINE

## 2022-01-10 PROCEDURE — 1036F TOBACCO NON-USER: CPT | Performed by: INTERNAL MEDICINE

## 2022-01-10 PROCEDURE — G8427 DOCREV CUR MEDS BY ELIG CLIN: HCPCS | Performed by: INTERNAL MEDICINE

## 2022-01-10 PROCEDURE — 1123F ACP DISCUSS/DSCN MKR DOCD: CPT | Performed by: INTERNAL MEDICINE

## 2022-01-10 PROCEDURE — 93000 ELECTROCARDIOGRAM COMPLETE: CPT | Performed by: INTERNAL MEDICINE

## 2022-01-10 PROCEDURE — G8417 CALC BMI ABV UP PARAM F/U: HCPCS | Performed by: INTERNAL MEDICINE

## 2022-01-10 PROCEDURE — G8484 FLU IMMUNIZE NO ADMIN: HCPCS | Performed by: INTERNAL MEDICINE

## 2022-01-10 PROCEDURE — 99213 OFFICE O/P EST LOW 20 MIN: CPT | Performed by: INTERNAL MEDICINE

## 2022-01-10 RX ORDER — AMLODIPINE BESYLATE 5 MG/1
5 TABLET ORAL DAILY
Qty: 90 TABLET | Refills: 1 | Status: SHIPPED
Start: 2022-01-10 | End: 2022-07-05

## 2022-01-10 NOTE — PROGRESS NOTES
[address] [facility]   Heart and Vascular [place]   Clinic Note     Date:1/10/22   Patient Name:Júnior Roque  YOB: 1935  Age: 82 y.o. Primary Care Provider: Jesse Valdivia DO    Subjective     82-year-old  male returns for follow-up. He still has some dyspnea with significant exertion but is overall improved compared to last assessment. He has not had any chest pain, orthopnea, PND, palpitations, syncope, presyncope, lower extremity edema. He is compliant with his medications. He has not had any pathologic bleeding. He is not sure his blood pressure cuff at home works very well but he reports systolics in the 877J to 086Q. A focused history review includes:  1. CAD with acute coronary syndrome status post unsuccessful PCI in 1998 requiring emergent CABG with LIMA to LAD, SVG to RCA (Dr. Velma Su at [facility])  a. Sternal malunion requiring reintervention ~2000  b. TTE 10/2021 at [facility]: EF 60%, mild-moderate aortic stenosis, mild AR  2. TIA late 1990s  3. Type 2 diabetes on orals  4. [id number] with baseline creatinine 1.5 - Dr. Galo Vaughan  5. Hypertension  6. Hyperlipidemia  7. Former smoker 10 pack years-quit 1964  8. CARLOS on CPAP  9.  GERD  10. COVID-19 pneumonia December 2020 - 4G Tiffany negative      Past History    Past Medical History:         Diagnosis Date    ACS (acute coronary syndrome) ([facility] [address] 05/01/1998    CAD (coronary artery disease)     COVID-19 12/15/2020    Hyperlipidemia     Hypertension     Sleep apnea     Stroke ([facility] [address]     Type II or unspecified type diabetes mellitus without mention of complication, not stated as uncontrolled        Past Surgical History:  Past Surgical History:   Procedure Laterality Date    CHOLECYSTECTOMY      CORONARY ARTERY BYPASS GRAFT  1998    LIMA-LAD, SVG-RCA    TOTAL KNEE ARTHROPLASTY  2000       Social History:    Social History     Tobacco History     Smoking Status  Former Smoker Quit date  1/1/1964 Smoking Frequency  1 pack/day for 10 years (10 pk yrs) Smoking Tobacco Type  Cigarettes    Smokeless Tobacco Use  Never Used          Alcohol History     Alcohol Use Status  Not Currently Comment  none since 1972          Drug Use     Drug Use Status  Never          Sexual Activity     Sexually Active  Not Asked                    Family History:-      Problem Relation Age of Onset    No Known Problems Sister     No Known Problems Sister     No Known Problems Sister     No Known Problems Brother          Review of Systems   Negative except as in HPI        Medications     Current Outpatient Medications   Medication Sig Dispense Refill    amLODIPine (NORVASC) 5 MG tablet Take 1 tablet by mouth daily 90 tablet 1    fenofibrate (TRICOR) 54 MG tablet Take 54 mg by mouth daily       famotidine (PEPCID) 20 MG tablet Take 20 mg by mouth daily       metoprolol tartrate (LOPRESSOR) 50 MG tablet Take 1 tablet by mouth 2 times daily 180 tablet 3    atorvastatin (LIPITOR) 40 MG tablet Take 1 tablet by mouth daily 90 tablet 3    aspirin (ASPIRIN CHILDRENS) 81 MG chewable tablet Take 1 tablet by mouth daily 90 tablet 3    Cholecalciferol (VITAMIN D3) 5000 units TABS Take 5,000 Units by mouth daily      CPAP Machine MISC by Does not apply route nightly       metformin (GLUCOPHAGE) 500 MG tablet Take 500 mg by mouth 2 times daily (with meals).  multivitamin (THERAGRAN) per tablet Take 1 tablet by mouth daily.  albuterol sulfate  (90 Base) MCG/ACT inhaler Inhale 2 puffs into the lungs every 6 hours (Patient not taking: Reported on 1/10/2022) 1 Inhaler 3     No current facility-administered medications for this visit. Physical Examination      BP (!) 150/74 (Site: Left Upper Arm, Position: Sitting, Cuff Size: Medium Adult)   Pulse 57   Resp 18   Ht 5' 9\" (1.753 m)   Wt 193 lb 3.2 oz (87.6 kg)   SpO2 98%   BMI 28.53 kg/m²   Body surface area is 2.07 meters squared. General: No acute distress, appears as stated age, nonicteric  Head: Atraumatic, no gross abnormalities or bruises  Neck: Supple and nontender, no carotid bruits, normal JVP  Lungs: Clear to auscultation bilaterally, no wheezes, rales, or rhonchi  Heart: Regular rate and rhythm. 2/6 systolic ejection murmur over the upper right sternal border  Abdomen: Soft, nontender, nondistended, normal bowel sounds  Extremities: No obvious deformities, no cyanosis, no pitting edema  Neurological: Alert and oriented x3, EOMI, moving all extremities x4  Psychological: Normal mood and affect, cooperative  Skin: Color, texture, and turgor normal for age          Labs/Imaging/Diagnostics   Personally reviewed:    Lab Results   Component Value Date     11/23/2021    K 5.0 11/23/2021    K 4.2 12/20/2020     11/23/2021    CO2 24 11/23/2021    BUN 24 11/23/2021    CREATININE 1.9 11/23/2021    GLUCOSE 124 11/23/2021    CALCIUM 10.4 11/23/2021        Estimated Creatinine Clearance: 32 mL/min (A) (based on SCr of 1.9 mg/dL (H)).     Lab Results   Component Value Date    WBC 5.7 11/23/2021    HGB 12.9 11/23/2021    HCT 38.9 11/23/2021    MCV 97.5 11/23/2021     11/23/2021       Lab Results   Component Value Date    ALT 25 11/23/2021    AST 33 11/23/2021    ALKPHOS 84 11/23/2021    BILITOT 1.3 (H) 11/23/2021       Lab Results   Component Value Date    LABALBU 4.7 11/23/2021     Lab Results   Component Value Date    CHOL 116 11/23/2021     Lab Results   Component Value Date    TRIG 128 11/23/2021     Lab Results   Component Value Date    HDL 37 11/23/2021     Lab Results   Component Value Date    LDLCALC 53 11/23/2021     Lab Results   Component Value Date    LABVLDL 26 11/23/2021           Personally interpreted the following studies.:  EKG: Today  Normal sinus rhythm with left anterior fascicular block, IVCD, LVH        Assessment and Plan:        53-year-old  male with NYHA class I dyspnea on exertion who is euvolemic today and has no angina or symptoms of arrhythmia. Diagnosis Orders   1. Coronary artery disease involving native coronary artery of native heart without angina pectoris  EKG 12 lead   2. Personal history of COVID-19  CT CHEST WO CONTRAST   3. S/P CABG x 2     4. Primary hypertension     5. Stage 3b chronic kidney disease ([address]     6. Nonrheumatic aortic (valve) stenosis with insufficiency          · Continue aspirin 81 mg daily  · Continue atorvastatin 40 mg daily  · Continue metoprolol tartrate 50 mg p.o. twice daily  · Start amlodipine 5 mg p.o. daily for blood pressure. Goal blood pressure less than 140/90 given his age  · Chest CT without contrast for follow-up after his COVID-pneumonia at the end of 2020. · We will retrieve his TTE [facility] from [facility]  · TTE at the end of 2023       Assessment and plan reviewed with the patient/family and their questions and concerns answered in full.  Follow up with me in 12 months    We appreciate the opportunity to participate in His care!       Pancho Torres MD, [facility]  Interventional Cardiology/Structural Heart Disease  Office: [phone]  Fax: [phone]  Office Coordinators: Dania Hauser

## 2022-01-10 NOTE — PATIENT INSTRUCTIONS
1. Start amlodipine 5 mg daily  2. Bring your blood pressure cuff either to us or PCP's office to make sure it's accurate  3. Call us in 4 weeks with blood pressure numbers  4. CT scan of the chest for your lungs  5. Continue aspirin as is  6.  Return in 12 months

## 2022-01-19 ENCOUNTER — HOSPITAL ENCOUNTER (OUTPATIENT)
Dept: CT IMAGING | Age: 87
Discharge: HOME | End: 2022-01-21
Payer: MEDICARE

## 2022-01-19 DIAGNOSIS — Z86.16 PERSONAL HISTORY OF COVID-19: ICD-10-CM

## 2022-01-19 PROCEDURE — 71250 CT THORAX DX C-: CPT

## 2022-01-25 ENCOUNTER — TELEPHONE (OUTPATIENT)
Dept: CARDIOLOGY CLINIC | Age: 87
End: 2022-01-25

## 2022-01-25 NOTE — TELEPHONE ENCOUNTER
----- Message from Giovanny Jeffers MD sent at 1/24/2022  7:32 AM EST -----  Can you send this to his PCP please to follow-up on the lung abnormalities.   Thanks

## 2022-02-07 ENCOUNTER — HOSPITAL ENCOUNTER (OUTPATIENT)
Age: 87
Discharge: HOME | End: 2022-02-07
Payer: MEDICARE

## 2022-02-07 LAB
ALBUMIN SERPL-MCNC: 4.8 G/DL (ref 3.5–5.2)
ALP BLD-CCNC: 82 U/L (ref 40–129)
ALT SERPL-CCNC: 24 U/L (ref 0–40)
ANION GAP SERPL CALCULATED.3IONS-SCNC: 13 MMOL/L (ref 7–16)
AST SERPL-CCNC: 31 U/L (ref 0–39)
BACTERIA: ABNORMAL /HPF
BASOPHILS ABSOLUTE: 0.08 E9/L (ref 0–0.2)
BASOPHILS RELATIVE PERCENT: 1.3 % (ref 0–2)
BILIRUB SERPL-MCNC: 0.9 MG/DL (ref 0–1.2)
BILIRUBIN URINE: NEGATIVE
BLOOD, URINE: NORMAL
BUN BLDV-MCNC: 25 MG/DL (ref 6–23)
CALCIUM SERPL-MCNC: 10.2 MG/DL (ref 8.6–10.2)
CHLORIDE BLD-SCNC: 103 MMOL/L (ref 98–107)
CLARITY: CLEAR
CO2: 24 MMOL/L (ref 22–29)
COLOR: YELLOW
CREAT SERPL-MCNC: 1.9 MG/DL (ref 0.7–1.2)
CREATININE URINE: 154 MG/DL (ref 40–278)
EOSINOPHILS ABSOLUTE: 0.25 E9/L (ref 0.05–0.5)
EOSINOPHILS RELATIVE PERCENT: 4.1 % (ref 0–6)
EPITHELIAL CELLS, UA: ABNORMAL /HPF
GFR AFRICAN AMERICAN: 41
GFR NON-AFRICAN AMERICAN: 34 ML/MIN/1.73
GLUCOSE BLD-MCNC: 164 MG/DL (ref 74–99)
GLUCOSE URINE: NEGATIVE MG/DL
HCT VFR BLD CALC: 37.4 % (ref 37–54)
HEMOGLOBIN: 12.6 G/DL (ref 12.5–16.5)
IMMATURE GRANULOCYTES #: 0.03 E9/L
IMMATURE GRANULOCYTES %: 0.5 % (ref 0–5)
KETONES, URINE: NEGATIVE MG/DL
LEUKOCYTE ESTERASE, URINE: NEGATIVE
LYMPHOCYTES ABSOLUTE: 2.05 E9/L (ref 1.5–4)
LYMPHOCYTES RELATIVE PERCENT: 33.9 % (ref 20–42)
MAGNESIUM: 2 MG/DL (ref 1.6–2.6)
MCH RBC QN AUTO: 32.6 PG (ref 26–35)
MCHC RBC AUTO-ENTMCNC: 33.7 % (ref 32–34.5)
MCV RBC AUTO: 96.9 FL (ref 80–99.9)
MONOCYTES ABSOLUTE: 0.63 E9/L (ref 0.1–0.95)
MONOCYTES RELATIVE PERCENT: 10.4 % (ref 2–12)
NEUTROPHILS ABSOLUTE: 3.01 E9/L (ref 1.8–7.3)
NEUTROPHILS RELATIVE PERCENT: 49.8 % (ref 43–80)
NITRITE, URINE: NEGATIVE
PARATHYROID HORMONE INTACT: 23 PG/ML (ref 15–65)
PDW BLD-RTO: 12.7 FL (ref 11.5–15)
PH UA: 5.5 (ref 5–9)
PHOSPHORUS: 3.4 MG/DL (ref 2.5–4.5)
PLATELET # BLD: 221 E9/L (ref 130–450)
PMV BLD AUTO: 10.3 FL (ref 7–12)
POTASSIUM SERPL-SCNC: 4.8 MMOL/L (ref 3.5–5)
PRO-BNP: 266 PG/ML (ref 0–450)
PROTEIN PROTEIN: 16 MG/DL (ref 0–12)
PROTEIN UA: NEGATIVE MG/DL
PROTEIN/CREAT RATIO: 0.1
PROTEIN/CREAT RATIO: 0.1 (ref 0–0.2)
RBC # BLD: 3.86 E12/L (ref 3.8–5.8)
RBC UA: ABNORMAL /HPF (ref 0–2)
SODIUM BLD-SCNC: 140 MMOL/L (ref 132–146)
SPECIFIC GRAVITY UA: >=1.03 (ref 1–1.03)
TOTAL PROTEIN: 8.3 G/DL (ref 6.4–8.3)
URIC ACID, SERUM: 5.8 MG/DL (ref 3.4–7)
UROBILINOGEN, URINE: 0.2 E.U./DL
VITAMIN D 25-HYDROXY: 56 NG/ML (ref 30–100)
WBC # BLD: 6.1 E9/L (ref 4.5–11.5)
WBC UA: ABNORMAL /HPF (ref 0–5)

## 2022-02-07 PROCEDURE — 81001 URINALYSIS AUTO W/SCOPE: CPT

## 2022-02-07 PROCEDURE — 85025 COMPLETE CBC W/AUTO DIFF WBC: CPT

## 2022-02-07 PROCEDURE — 84550 ASSAY OF BLOOD/URIC ACID: CPT

## 2022-02-07 PROCEDURE — 80053 COMPREHEN METABOLIC PANEL: CPT

## 2022-02-07 PROCEDURE — 84100 ASSAY OF PHOSPHORUS: CPT

## 2022-02-07 PROCEDURE — 83970 ASSAY OF PARATHORMONE: CPT

## 2022-02-07 PROCEDURE — 82570 ASSAY OF URINE CREATININE: CPT

## 2022-02-07 PROCEDURE — 83880 ASSAY OF NATRIURETIC PEPTIDE: CPT

## 2022-02-07 PROCEDURE — 83735 ASSAY OF MAGNESIUM: CPT

## 2022-02-07 PROCEDURE — 84156 ASSAY OF PROTEIN URINE: CPT

## 2022-02-07 PROCEDURE — 36415 COLL VENOUS BLD VENIPUNCTURE: CPT

## 2022-02-07 PROCEDURE — 82306 VITAMIN D 25 HYDROXY: CPT

## 2022-06-21 ENCOUNTER — HOSPITAL ENCOUNTER (OUTPATIENT)
Age: 87
Discharge: HOME | End: 2022-06-21
Payer: MEDICARE

## 2022-06-21 LAB
ALBUMIN SERPL-MCNC: 4.5 G/DL (ref 3.5–5.2)
ALP BLD-CCNC: 80 U/L (ref 40–129)
ALT SERPL-CCNC: 21 U/L (ref 0–40)
ANION GAP SERPL CALCULATED.3IONS-SCNC: 11 MMOL/L (ref 7–16)
AST SERPL-CCNC: 29 U/L (ref 0–39)
BACTERIA: NORMAL /HPF
BASOPHILS ABSOLUTE: 0.1 E9/L (ref 0–0.2)
BASOPHILS RELATIVE PERCENT: 1.7 % (ref 0–2)
BILIRUB SERPL-MCNC: 0.9 MG/DL (ref 0–1.2)
BILIRUBIN URINE: NEGATIVE
BLOOD, URINE: NEGATIVE
BUN BLDV-MCNC: 18 MG/DL (ref 6–23)
CALCIUM SERPL-MCNC: 10.3 MG/DL (ref 8.6–10.2)
CHLORIDE BLD-SCNC: 104 MMOL/L (ref 98–107)
CLARITY: CLEAR
CO2: 23 MMOL/L (ref 22–29)
COLOR: YELLOW
CREAT SERPL-MCNC: 1.5 MG/DL (ref 0.7–1.2)
CREATININE URINE: 47 MG/DL (ref 40–278)
EOSINOPHILS ABSOLUTE: 0.3 E9/L (ref 0.05–0.5)
EOSINOPHILS RELATIVE PERCENT: 5.1 % (ref 0–6)
GFR AFRICAN AMERICAN: 54
GFR NON-AFRICAN AMERICAN: 44 ML/MIN/1.73
GLUCOSE BLD-MCNC: 123 MG/DL (ref 74–99)
GLUCOSE URINE: NEGATIVE MG/DL
HBA1C MFR BLD: 6.8 % (ref 4–5.6)
HCT VFR BLD CALC: 36.7 % (ref 37–54)
HEMOGLOBIN: 12.4 G/DL (ref 12.5–16.5)
IMMATURE GRANULOCYTES #: 0.02 E9/L
IMMATURE GRANULOCYTES %: 0.3 % (ref 0–5)
KETONES, URINE: NEGATIVE MG/DL
LEUKOCYTE ESTERASE, URINE: NEGATIVE
LYMPHOCYTES ABSOLUTE: 2.51 E9/L (ref 1.5–4)
LYMPHOCYTES RELATIVE PERCENT: 42.7 % (ref 20–42)
MAGNESIUM: 1.8 MG/DL (ref 1.6–2.6)
MCH RBC QN AUTO: 33.2 PG (ref 26–35)
MCHC RBC AUTO-ENTMCNC: 33.8 % (ref 32–34.5)
MCV RBC AUTO: 98.1 FL (ref 80–99.9)
MONOCYTES ABSOLUTE: 0.7 E9/L (ref 0.1–0.95)
MONOCYTES RELATIVE PERCENT: 11.9 % (ref 2–12)
NEUTROPHILS ABSOLUTE: 2.25 E9/L (ref 1.8–7.3)
NEUTROPHILS RELATIVE PERCENT: 38.3 % (ref 43–80)
NITRITE, URINE: NEGATIVE
PDW BLD-RTO: 13.2 FL (ref 11.5–15)
PH UA: 5.5 (ref 5–9)
PHOSPHORUS: 3.2 MG/DL (ref 2.5–4.5)
PLATELET # BLD: 229 E9/L (ref 130–450)
PMV BLD AUTO: 10.6 FL (ref 7–12)
POTASSIUM SERPL-SCNC: 4.7 MMOL/L (ref 3.5–5)
PROTEIN PROTEIN: 5 MG/DL (ref 0–12)
PROTEIN UA: NEGATIVE MG/DL
PROTEIN/CREAT RATIO: 0.1
PROTEIN/CREAT RATIO: 0.1 (ref 0–0.2)
RBC # BLD: 3.74 E12/L (ref 3.8–5.8)
RBC UA: NORMAL /HPF (ref 0–2)
SODIUM BLD-SCNC: 138 MMOL/L (ref 132–146)
SPECIFIC GRAVITY UA: 1.01 (ref 1–1.03)
TOTAL PROTEIN: 7.8 G/DL (ref 6.4–8.3)
URIC ACID, SERUM: 5 MG/DL (ref 3.4–7)
UROBILINOGEN, URINE: 0.2 E.U./DL
WBC # BLD: 5.9 E9/L (ref 4.5–11.5)
WBC UA: NORMAL /HPF (ref 0–5)

## 2022-06-21 PROCEDURE — 84100 ASSAY OF PHOSPHORUS: CPT

## 2022-06-21 PROCEDURE — 36415 COLL VENOUS BLD VENIPUNCTURE: CPT

## 2022-06-21 PROCEDURE — 84550 ASSAY OF BLOOD/URIC ACID: CPT

## 2022-06-21 PROCEDURE — 82570 ASSAY OF URINE CREATININE: CPT

## 2022-06-21 PROCEDURE — 85025 COMPLETE CBC W/AUTO DIFF WBC: CPT

## 2022-06-21 PROCEDURE — 84156 ASSAY OF PROTEIN URINE: CPT

## 2022-06-21 PROCEDURE — 83735 ASSAY OF MAGNESIUM: CPT

## 2022-06-21 PROCEDURE — 83036 HEMOGLOBIN GLYCOSYLATED A1C: CPT

## 2022-06-21 PROCEDURE — 80053 COMPREHEN METABOLIC PANEL: CPT

## 2022-06-21 PROCEDURE — 81001 URINALYSIS AUTO W/SCOPE: CPT

## 2022-07-05 RX ORDER — AMLODIPINE BESYLATE 5 MG/1
5 TABLET ORAL DAILY
Qty: 90 TABLET | Refills: 1 | OUTPATIENT
Start: 2022-07-05

## 2022-07-05 RX ORDER — AMLODIPINE BESYLATE 5 MG/1
TABLET ORAL
Qty: 90 TABLET | Refills: 1 | Status: SHIPPED | OUTPATIENT
Start: 2022-07-05

## 2022-09-30 RX ORDER — ASPIRIN 81 MG
TABLET,CHEWABLE ORAL
Qty: 90 TABLET | Refills: 3 | Status: SHIPPED | OUTPATIENT
Start: 2022-09-30

## 2022-09-30 RX ORDER — METOPROLOL TARTRATE 50 MG/1
TABLET, FILM COATED ORAL
Qty: 180 TABLET | Refills: 3 | Status: SHIPPED | OUTPATIENT
Start: 2022-09-30

## 2022-09-30 RX ORDER — ATORVASTATIN CALCIUM 40 MG/1
TABLET, FILM COATED ORAL
Qty: 90 TABLET | Refills: 3 | Status: SHIPPED | OUTPATIENT
Start: 2022-09-30

## 2022-10-11 ENCOUNTER — TELEPHONE (OUTPATIENT)
Dept: CARDIOLOGY | Age: 87
End: 2022-10-11

## 2022-10-12 ENCOUNTER — HOSPITAL ENCOUNTER (OUTPATIENT)
Dept: CARDIOLOGY | Age: 87
Discharge: HOME | End: 2022-10-12
Payer: MEDICARE

## 2022-10-12 ENCOUNTER — HOSPITAL ENCOUNTER (OUTPATIENT)
Age: 87
Discharge: HOME | End: 2022-10-12
Payer: MEDICARE

## 2022-10-12 DIAGNOSIS — I25.10 CORONARY ARTERY DISEASE INVOLVING NATIVE CORONARY ARTERY OF NATIVE HEART WITHOUT ANGINA PECTORIS: ICD-10-CM

## 2022-10-12 LAB
ALBUMIN SERPL-MCNC: 4.4 G/DL (ref 3.5–5.2)
ALP BLD-CCNC: 73 U/L (ref 40–129)
ALT SERPL-CCNC: 16 U/L (ref 0–40)
ANION GAP SERPL CALCULATED.3IONS-SCNC: 11 MMOL/L (ref 7–16)
AST SERPL-CCNC: 27 U/L (ref 0–39)
BASOPHILS ABSOLUTE: 0.07 E9/L (ref 0–0.2)
BASOPHILS RELATIVE PERCENT: 1 % (ref 0–2)
BILIRUB SERPL-MCNC: 0.8 MG/DL (ref 0–1.2)
BILIRUBIN URINE: NEGATIVE
BLOOD, URINE: NEGATIVE
BUN BLDV-MCNC: 24 MG/DL (ref 6–23)
CALCIUM SERPL-MCNC: 9.9 MG/DL (ref 8.6–10.2)
CHLORIDE BLD-SCNC: 104 MMOL/L (ref 98–107)
CLARITY: CLEAR
CO2: 21 MMOL/L (ref 22–29)
COLOR: YELLOW
CREAT SERPL-MCNC: 1.7 MG/DL (ref 0.7–1.2)
CREATININE URINE: 102 MG/DL (ref 40–278)
EOSINOPHILS ABSOLUTE: 0.2 E9/L (ref 0.05–0.5)
EOSINOPHILS RELATIVE PERCENT: 3 % (ref 0–6)
GFR AFRICAN AMERICAN: 47
GFR NON-AFRICAN AMERICAN: 38 ML/MIN/1.73
GLUCOSE BLD-MCNC: 113 MG/DL (ref 74–99)
GLUCOSE URINE: NEGATIVE MG/DL
HCT VFR BLD CALC: 35.8 % (ref 37–54)
HEMOGLOBIN: 12.4 G/DL (ref 12.5–16.5)
IMMATURE GRANULOCYTES #: 0.02 E9/L
IMMATURE GRANULOCYTES %: 0.3 % (ref 0–5)
KETONES, URINE: NEGATIVE MG/DL
LEUKOCYTE ESTERASE, URINE: NEGATIVE
LV EF: 68 %
LVEF MODALITY: NORMAL
LYMPHOCYTES ABSOLUTE: 2.78 E9/L (ref 1.5–4)
LYMPHOCYTES RELATIVE PERCENT: 41.1 % (ref 20–42)
MAGNESIUM: 2 MG/DL (ref 1.6–2.6)
MCH RBC QN AUTO: 33.7 PG (ref 26–35)
MCHC RBC AUTO-ENTMCNC: 34.6 % (ref 32–34.5)
MCV RBC AUTO: 97.3 FL (ref 80–99.9)
MICROALBUMIN UR-MCNC: 13 MG/L
MICROALBUMIN/CREAT UR-RTO: 12.7 (ref 0–30)
MONOCYTES ABSOLUTE: 0.65 E9/L (ref 0.1–0.95)
MONOCYTES RELATIVE PERCENT: 9.6 % (ref 2–12)
NEUTROPHILS ABSOLUTE: 3.04 E9/L (ref 1.8–7.3)
NEUTROPHILS RELATIVE PERCENT: 45 % (ref 43–80)
NITRITE, URINE: NEGATIVE
PARATHYROID HORMONE INTACT: 22 PG/ML (ref 15–65)
PDW BLD-RTO: 13.2 FL (ref 11.5–15)
PH UA: 5.5 (ref 5–9)
PHOSPHORUS: 3.4 MG/DL (ref 2.5–4.5)
PLATELET # BLD: 223 E9/L (ref 130–450)
PMV BLD AUTO: 10.3 FL (ref 7–12)
POTASSIUM SERPL-SCNC: 4.6 MMOL/L (ref 3.5–5)
PROTEIN PROTEIN: 9 MG/DL (ref 0–12)
PROTEIN UA: NEGATIVE MG/DL
PROTEIN/CREAT RATIO: 0.1
PROTEIN/CREAT RATIO: 0.1 (ref 0–0.2)
RBC # BLD: 3.68 E12/L (ref 3.8–5.8)
SODIUM BLD-SCNC: 136 MMOL/L (ref 132–146)
SPECIFIC GRAVITY UA: 1.02 (ref 1–1.03)
TOTAL PROTEIN: 7.8 G/DL (ref 6.4–8.3)
URIC ACID, SERUM: 5.6 MG/DL (ref 3.4–7)
UROBILINOGEN, URINE: 0.2 E.U./DL
VITAMIN D 25-HYDROXY: 82 NG/ML (ref 30–100)
WBC # BLD: 6.8 E9/L (ref 4.5–11.5)

## 2022-10-12 PROCEDURE — 84100 ASSAY OF PHOSPHORUS: CPT

## 2022-10-12 PROCEDURE — 82044 UR ALBUMIN SEMIQUANTITATIVE: CPT

## 2022-10-12 PROCEDURE — 84156 ASSAY OF PROTEIN URINE: CPT

## 2022-10-12 PROCEDURE — 83970 ASSAY OF PARATHORMONE: CPT

## 2022-10-12 PROCEDURE — 80053 COMPREHEN METABOLIC PANEL: CPT

## 2022-10-12 PROCEDURE — 36415 COLL VENOUS BLD VENIPUNCTURE: CPT

## 2022-10-12 PROCEDURE — 2580000003 HC RX 258: Performed by: INTERNAL MEDICINE

## 2022-10-12 PROCEDURE — 93306 TTE W/DOPPLER COMPLETE: CPT

## 2022-10-12 PROCEDURE — 81003 URINALYSIS AUTO W/O SCOPE: CPT

## 2022-10-12 PROCEDURE — 85025 COMPLETE CBC W/AUTO DIFF WBC: CPT

## 2022-10-12 PROCEDURE — 83735 ASSAY OF MAGNESIUM: CPT

## 2022-10-12 PROCEDURE — 82306 VITAMIN D 25 HYDROXY: CPT

## 2022-10-12 PROCEDURE — 6360000004 HC RX CONTRAST MEDICATION: Performed by: INTERNAL MEDICINE

## 2022-10-12 PROCEDURE — 84550 ASSAY OF BLOOD/URIC ACID: CPT

## 2022-10-12 PROCEDURE — 82570 ASSAY OF URINE CREATININE: CPT

## 2022-10-12 RX ORDER — SODIUM CHLORIDE 0.9 % (FLUSH) 0.9 %
10 SYRINGE (ML) INJECTION PRN
Status: DISCONTINUED | OUTPATIENT
Start: 2022-10-12 | End: 2022-10-13 | Stop reason: HOSPADM

## 2022-10-12 RX ADMIN — SODIUM CHLORIDE, PRESERVATIVE FREE 10 ML: 5 INJECTION INTRAVENOUS at 12:08

## 2022-10-12 RX ADMIN — PERFLUTREN 1.1 MG: 6.52 INJECTION, SUSPENSION INTRAVENOUS at 12:08

## 2023-01-10 ENCOUNTER — HOSPITAL ENCOUNTER (OUTPATIENT)
Age: 88
Discharge: HOME | End: 2023-01-10
Payer: MEDICARE

## 2023-01-10 LAB
ALBUMIN SERPL-MCNC: 4.3 G/DL (ref 3.5–5.2)
ALP BLD-CCNC: 82 U/L (ref 40–129)
ALT SERPL-CCNC: 15 U/L (ref 0–40)
ANION GAP SERPL CALCULATED.3IONS-SCNC: 14 MMOL/L (ref 7–16)
AST SERPL-CCNC: 22 U/L (ref 0–39)
BASOPHILS ABSOLUTE: 0.06 E9/L (ref 0–0.2)
BASOPHILS RELATIVE PERCENT: 1.3 % (ref 0–2)
BILIRUB SERPL-MCNC: 0.7 MG/DL (ref 0–1.2)
BILIRUBIN URINE: NEGATIVE
BLOOD, URINE: NEGATIVE
BUN BLDV-MCNC: 23 MG/DL (ref 6–23)
CALCIUM SERPL-MCNC: 9.9 MG/DL (ref 8.6–10.2)
CHLORIDE BLD-SCNC: 102 MMOL/L (ref 98–107)
CHOLESTEROL, TOTAL: 147 MG/DL (ref 0–199)
CLARITY: CLEAR
CO2: 23 MMOL/L (ref 22–29)
COLOR: YELLOW
CREAT SERPL-MCNC: 1.6 MG/DL (ref 0.7–1.2)
CREATININE URINE: 138 MG/DL (ref 40–278)
EOSINOPHILS ABSOLUTE: 0.19 E9/L (ref 0.05–0.5)
EOSINOPHILS RELATIVE PERCENT: 4.2 % (ref 0–6)
GFR SERPL CREATININE-BSD FRML MDRD: 42 ML/MIN/1.73
GLUCOSE BLD-MCNC: 157 MG/DL (ref 74–99)
GLUCOSE URINE: NEGATIVE MG/DL
HBA1C MFR BLD: 6.6 % (ref 4–5.6)
HCT VFR BLD CALC: 36.1 % (ref 37–54)
HDLC SERPL-MCNC: 43 MG/DL
HEMOGLOBIN: 12.2 G/DL (ref 12.5–16.5)
IMMATURE GRANULOCYTES #: 0.02 E9/L
IMMATURE GRANULOCYTES %: 0.4 % (ref 0–5)
KETONES, URINE: NEGATIVE MG/DL
LDL CHOLESTEROL CALCULATED: 86 MG/DL (ref 0–99)
LEUKOCYTE ESTERASE, URINE: NEGATIVE
LYMPHOCYTES ABSOLUTE: 1.8 E9/L (ref 1.5–4)
LYMPHOCYTES RELATIVE PERCENT: 40 % (ref 20–42)
MAGNESIUM: 1.9 MG/DL (ref 1.6–2.6)
MCH RBC QN AUTO: 32.5 PG (ref 26–35)
MCHC RBC AUTO-ENTMCNC: 33.8 % (ref 32–34.5)
MCV RBC AUTO: 96.3 FL (ref 80–99.9)
MICROALBUMIN UR-MCNC: 28.5 MG/L
MICROALBUMIN/CREAT UR-RTO: 20.7 (ref 0–30)
MONOCYTES ABSOLUTE: 0.53 E9/L (ref 0.1–0.95)
MONOCYTES RELATIVE PERCENT: 11.8 % (ref 2–12)
NEUTROPHILS ABSOLUTE: 1.9 E9/L (ref 1.8–7.3)
NEUTROPHILS RELATIVE PERCENT: 42.3 % (ref 43–80)
NITRITE, URINE: NEGATIVE
PARATHYROID HORMONE INTACT: 25 PG/ML (ref 15–65)
PDW BLD-RTO: 13.1 FL (ref 11.5–15)
PH UA: 5.5 (ref 5–9)
PHOSPHORUS: 3.2 MG/DL (ref 2.5–4.5)
PLATELET # BLD: 218 E9/L (ref 130–450)
PMV BLD AUTO: 9.5 FL (ref 7–12)
POTASSIUM SERPL-SCNC: 4.5 MMOL/L (ref 3.5–5)
PROTEIN PROTEIN: 20 MG/DL (ref 0–12)
PROTEIN UA: NEGATIVE MG/DL
PROTEIN/CREAT RATIO: 0.1
PROTEIN/CREAT RATIO: 0.1 (ref 0–0.2)
RBC # BLD: 3.75 E12/L (ref 3.8–5.8)
SODIUM BLD-SCNC: 139 MMOL/L (ref 132–146)
SPECIFIC GRAVITY UA: >=1.03 (ref 1–1.03)
TOTAL PROTEIN: 8 G/DL (ref 6.4–8.3)
TRIGL SERPL-MCNC: 88 MG/DL (ref 0–149)
URIC ACID, SERUM: 5.5 MG/DL (ref 3.4–7)
UROBILINOGEN, URINE: 0.2 E.U./DL
VITAMIN D 25-HYDROXY: 58 NG/ML (ref 30–100)
VLDLC SERPL CALC-MCNC: 18 MG/DL
WBC # BLD: 4.5 E9/L (ref 4.5–11.5)

## 2023-01-10 PROCEDURE — 84550 ASSAY OF BLOOD/URIC ACID: CPT

## 2023-01-10 PROCEDURE — 81003 URINALYSIS AUTO W/O SCOPE: CPT

## 2023-01-10 PROCEDURE — 36415 COLL VENOUS BLD VENIPUNCTURE: CPT

## 2023-01-10 PROCEDURE — 83036 HEMOGLOBIN GLYCOSYLATED A1C: CPT

## 2023-01-10 PROCEDURE — 83735 ASSAY OF MAGNESIUM: CPT

## 2023-01-10 PROCEDURE — 80061 LIPID PANEL: CPT

## 2023-01-10 PROCEDURE — 82306 VITAMIN D 25 HYDROXY: CPT

## 2023-01-10 PROCEDURE — 85025 COMPLETE CBC W/AUTO DIFF WBC: CPT

## 2023-01-10 PROCEDURE — 84156 ASSAY OF PROTEIN URINE: CPT

## 2023-01-10 PROCEDURE — 82570 ASSAY OF URINE CREATININE: CPT

## 2023-01-10 PROCEDURE — 82044 UR ALBUMIN SEMIQUANTITATIVE: CPT

## 2023-01-10 PROCEDURE — 80053 COMPREHEN METABOLIC PANEL: CPT

## 2023-01-10 PROCEDURE — 84100 ASSAY OF PHOSPHORUS: CPT

## 2023-01-10 PROCEDURE — 83970 ASSAY OF PARATHORMONE: CPT

## 2023-02-01 ENCOUNTER — TELEPHONE (OUTPATIENT)
Dept: CARDIOLOGY CLINIC | Age: 88
End: 2023-02-01

## 2023-03-07 ENCOUNTER — OFFICE VISIT (OUTPATIENT)
Dept: CARDIOLOGY CLINIC | Age: 88
End: 2023-03-07
Payer: MEDICARE

## 2023-03-07 VITALS
RESPIRATION RATE: 18 BRPM | HEIGHT: 69 IN | SYSTOLIC BLOOD PRESSURE: 138 MMHG | WEIGHT: 191 LBS | BODY MASS INDEX: 28.29 KG/M2 | HEART RATE: 53 BPM | DIASTOLIC BLOOD PRESSURE: 68 MMHG

## 2023-03-07 DIAGNOSIS — Z95.1 S/P CABG X 2: ICD-10-CM

## 2023-03-07 DIAGNOSIS — I10 PRIMARY HYPERTENSION: Primary | ICD-10-CM

## 2023-03-07 DIAGNOSIS — E78.2 MIXED HYPERLIPIDEMIA: ICD-10-CM

## 2023-03-07 DIAGNOSIS — N18.32 STAGE 3B CHRONIC KIDNEY DISEASE (HCC): ICD-10-CM

## 2023-03-07 DIAGNOSIS — I25.10 CORONARY ARTERY DISEASE INVOLVING NATIVE CORONARY ARTERY OF NATIVE HEART WITHOUT ANGINA PECTORIS: ICD-10-CM

## 2023-03-07 PROCEDURE — G8427 DOCREV CUR MEDS BY ELIG CLIN: HCPCS | Performed by: INTERNAL MEDICINE

## 2023-03-07 PROCEDURE — 93000 ELECTROCARDIOGRAM COMPLETE: CPT | Performed by: INTERNAL MEDICINE

## 2023-03-07 PROCEDURE — 1036F TOBACCO NON-USER: CPT | Performed by: INTERNAL MEDICINE

## 2023-03-07 PROCEDURE — G8417 CALC BMI ABV UP PARAM F/U: HCPCS | Performed by: INTERNAL MEDICINE

## 2023-03-07 PROCEDURE — 1123F ACP DISCUSS/DSCN MKR DOCD: CPT | Performed by: INTERNAL MEDICINE

## 2023-03-07 PROCEDURE — G8484 FLU IMMUNIZE NO ADMIN: HCPCS | Performed by: INTERNAL MEDICINE

## 2023-03-07 PROCEDURE — 99214 OFFICE O/P EST MOD 30 MIN: CPT | Performed by: INTERNAL MEDICINE

## 2023-03-07 RX ORDER — LANOLIN ALCOHOL/MO/W.PET/CERES
500 CREAM (GRAM) TOPICAL NIGHTLY
COMMUNITY

## 2023-03-07 RX ORDER — PRAVASTATIN SODIUM 40 MG
40 TABLET ORAL DAILY
COMMUNITY

## 2023-03-07 RX ORDER — ATORVASTATIN CALCIUM 80 MG/1
80 TABLET, FILM COATED ORAL DAILY
Qty: 90 TABLET | Refills: 1 | Status: SHIPPED | OUTPATIENT
Start: 2023-03-07

## 2023-03-07 NOTE — PATIENT INSTRUCTIONS
Continue all your medications at current doses. Increase lipitor to 80 mg daily. Please check blood work (Lipid panel) in 3 months. Restrict sodium intake to less than 2-2.5 g/day. Restrict fluid intake to less than 2.2 L/day. Goal BP is less than 130/80. Please try to exercise for 150 minutes a week. I will see you back in the office in 6 months. Please call the office at ([phone], option [phone]) if you have any questions.

## 2023-03-07 NOTE — PROGRESS NOTES
[facility]   Heart and Vascular [place]   Clinic Note     Date:3/7/23   Patient Name:Júnior Serrano Jr  YOB: 1935  Age: 88 y.o.     Primary Care Provider: Reji Trejo DO    Subjective     88-year-old  male returns for follow-up.     He still has some dyspnea with significant exertion but is overall improved compared to last assessment.  He has not had any chest pain, orthopnea, PND, palpitations, syncope, presyncope, lower extremity edema.  He is compliant with his medications.  He has not had any pathologic bleeding.      He was prescribed Farxiga by his nephrologist.  However he was not able to afford it.  It was about $600 a month.      A focused history review includes:  CAD with acute coronary syndrome status post unsuccessful PCI in 1998 requiring emergent CABG with LIMA to LAD, SVG to RCA (Dr. 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 Mendoza  Hypertension  Hyperlipidemia  Former smoker 10 pack years-quit 1964  CARLOS on CPAP  GERD  COVID-19 pneumonia December 2020 - 4G Tiffany negative    Echocardiogram from 10/12/2022: EF 65 to 70%.  Grade 1 diastolic dysfunction.  Mild concentric LVH.      Past History    Past Medical History:         Diagnosis Date    ACS (acute coronary syndrome) (HCC) 05/01/1998    CAD (coronary artery disease)     COVID-19 12/15/2020    Hyperlipidemia     Hypertension     Sleep apnea     Stroke (HCC)     Type II or unspecified type diabetes mellitus without mention of complication, not stated as uncontrolled        Past Surgical History:  Past Surgical History:   Procedure Laterality Date    CHOLECYSTECTOMY      CORONARY ARTERY BYPASS GRAFT  1998    LIMA-LAD, SVG-RCA    TOTAL KNEE ARTHROPLASTY  2000       Social History:    Social History        Tobacco History       Smoking Status  Former Smoker Quit date  1/1/1964 Smoking Frequency  1 pack/day for 10 years (10 pk yrs) Smoking Tobacco Type  Cigarettes      Smokeless Tobacco Use  Never Used              Alcohol History       Alcohol Use Status  Not Currently Comment  none since 1972              Drug Use       Drug Use Status  Never              Sexual Activity       Sexually Active  Not Asked                        Family History:-      Problem Relation Age of Onset    No Known Problems Sister     No Known Problems Sister     No Known Problems Sister     No Known Problems Brother          Review of Systems   Negative except as in HPI        Medications     Current Outpatient Medications   Medication Sig Dispense Refill    niacin (SLO-NIACIN) 500 MG extended release tablet Take 500 mg by mouth nightly      pravastatin (PRAVACHOL) 40 MG tablet Take 40 mg by mouth daily      atorvastatin (LIPITOR) 80 MG tablet Take 1 tablet by mouth daily 90 tablet 1    metoprolol tartrate (LOPRESSOR) 50 MG tablet TAKE 1 TABLET BY MOUTH TWICE A  tablet 3    ASPIRIN LOW DOSE 81 MG chewable tablet TAKE 1 TABLET BY MOUTH EVERY DAY 90 tablet 3    amLODIPine (NORVASC) 5 MG tablet TAKE 1 TABLET BY MOUTH EVERY DAY 90 tablet 1    fenofibrate (TRICOR) 54 MG tablet Take 54 mg by mouth daily       famotidine (PEPCID) 20 MG tablet Take 20 mg by mouth daily       albuterol sulfate  (90 Base) MCG/ACT inhaler Inhale 2 puffs into the lungs every 6 hours 1 Inhaler 3    CPAP Machine MISC by Does not apply route nightly       metformin (GLUCOPHAGE) 500 MG tablet Take 500 mg by mouth 2 times daily (with meals). multivitamin (THERAGRAN) per tablet Take 1 tablet by mouth daily. Cholecalciferol (VITAMIN D3) 5000 units TABS Take 5,000 Units by mouth daily (Patient not taking: Reported on 3/7/2023)       No current facility-administered medications for this visit.            Physical Examination      /68   Pulse 53 Resp 18   Ht 5' 9\" (1.753 m)   Wt 191 lb (86.6 kg)   BMI 28.21 kg/m²   Body surface area is 2.05 meters squared. General: No acute distress, appears as stated age, nonicteric  Head: Atraumatic, no gross abnormalities or bruises  Neck: Supple and nontender, no carotid bruits, normal JVP  Lungs: Clear to auscultation bilaterally, no wheezes, rales, or rhonchi  Heart: Regular rate and rhythm. 2/6 systolic ejection murmur over the upper right sternal border  Abdomen: Soft, nontender, nondistended, normal bowel sounds  Extremities: No obvious deformities, no cyanosis, no pitting edema  Neurological: Alert and oriented x3, EOMI, moving all extremities x4  Psychological: Normal mood and affect, cooperative  Skin: Color, texture, and turgor normal for age          Labs/Imaging/Diagnostics   Personally reviewed:    Lab Results   Component Value Date/Time     01/10/2023 10:38 AM    K 4.5 01/10/2023 10:38 AM    K 4.2 12/20/2020 06:00 AM     01/10/2023 10:38 AM    CO2 23 01/10/2023 10:38 AM    BUN 23 01/10/2023 10:38 AM    CREATININE 1.6 01/10/2023 10:38 AM    GLUCOSE 157 01/10/2023 10:38 AM    CALCIUM 9.9 01/10/2023 10:38 AM        Estimated Creatinine Clearance: 36 mL/min (A) (based on SCr of 1.6 mg/dL (H)).     Lab Results   Component Value Date    WBC 4.5 01/10/2023    HGB 12.2 (L) 01/10/2023    HCT 36.1 (L) 01/10/2023    MCV 96.3 01/10/2023     01/10/2023       Lab Results   Component Value Date    ALT 15 01/10/2023    AST 22 01/10/2023    ALKPHOS 82 01/10/2023    BILITOT 0.7 01/10/2023       Lab Results   Component Value Date    LABPROT 0.1 01/10/2023    LABPROT 0.1 01/10/2023    LABALBU 4.3 01/10/2023     Lab Results   Component Value Date    CHOL 147 01/10/2023    CHOL 116 11/23/2021     Lab Results   Component Value Date    TRIG 88 01/10/2023    TRIG 128 11/23/2021     Lab Results   Component Value Date    HDL 43 01/10/2023    HDL 37 11/23/2021     Lab Results   Component Value Date [address] 01/10/2023    LDLCALC 53 11/23/2021     Lab Results   Component Value Date    LABVLDL 18 01/10/2023    LABVLDL 26 11/23/2021           Personally interpreted the following studies.:  EKG: Today  Normal sinus rhythm with left anterior fascicular block, IVCD, LVH        Assessment and Plan:        82-year-old  male with NYHA class I dyspnea on exertion who is euvolemic today and has no angina or symptoms of arrhythmia. Diagnosis Orders   1. Primary hypertension  EKG 12 Lead      2. Coronary artery disease involving native coronary artery of native heart without angina pectoris        3. S/P CABG x 2        4. Stage 3b chronic kidney disease ([facility] [address]        5. Mixed hyperlipidemia  Lipid Panel           Continue aspirin 81 mg daily  His LDL from 1/10/2023 is at 86. Goal is less than 60. I will increase the Lipitor to 80 mg daily. Recheck lipid panel in 3 months  Continue metoprolol tartrate 50 mg p.o. twice daily  Started on amlodipine 5 mg daily at the last visit. Goal blood pressure less than 140/90 given his age. His pressures appear to be at goal.  Recent echocardiogram findings as above. I will refer him to prescription assistance to see if SGLT2 inhibitors can be affordable. Assessment and plan reviewed with the patient/family and their questions and concerns answered in full. Follow up with me in 6 months    We appreciate the opportunity to participate in His care! Leeann Belcher MD, [facility] Cardiology     NOTE: This report was transcribed using voice recognition software. Every effort was made to ensure accuracy; however, inadvertent computerized transcription errors may be present.

## 2023-03-13 RX ORDER — AMLODIPINE BESYLATE 5 MG/1
TABLET ORAL
Qty: 90 TABLET | Refills: 3 | Status: SHIPPED | OUTPATIENT
Start: 2023-03-13

## 2023-03-20 NOTE — ED NOTES
I have reentered it but no pharmacy benefits popped up for me    Spoke with patient's wife, Dannie Prima, and updated on patient's condition and plan of care.       Ricky Pool RN  12/19/20 2043

## 2023-07-28 ENCOUNTER — HOSPITAL ENCOUNTER (OUTPATIENT)
Age: 88
Discharge: HOME | End: 2023-07-30

## 2023-07-28 PROCEDURE — 88342 IMHCHEM/IMCYTCHM 1ST ANTB: CPT

## 2023-07-28 PROCEDURE — 88305 TISSUE EXAM BY PATHOLOGIST: CPT

## 2023-07-28 PROCEDURE — 87077 CULTURE AEROBIC IDENTIFY: CPT

## 2023-07-29 LAB
HELIOBACTER PYLORI ID: NEGATIVE
SOURCE, 60200063: NORMAL

## 2023-08-02 LAB — SURGICAL PATHOLOGY REPORT: NORMAL

## 2023-08-09 ENCOUNTER — HOSPITAL ENCOUNTER (OUTPATIENT)
Age: 88
Discharge: HOME | End: 2023-08-09
Payer: MEDICARE

## 2023-08-09 LAB
25(OH)D3 SERPL-MCNC: 35.9 NG/ML (ref 30–100)
ALBUMIN SERPL-MCNC: 4.7 G/DL (ref 3.5–5.2)
ALP SERPL-CCNC: 74 U/L (ref 40–129)
ALT SERPL-CCNC: 18 U/L (ref 0–40)
ANION GAP SERPL CALCULATED.3IONS-SCNC: 11 MMOL/L (ref 7–16)
AST SERPL-CCNC: 24 U/L (ref 0–39)
BASOPHILS # BLD: 0.07 K/UL (ref 0–0.2)
BASOPHILS NFR BLD: 1 % (ref 0–2)
BILIRUB SERPL-MCNC: 0.9 MG/DL (ref 0–1.2)
BILIRUB UR QL STRIP: NEGATIVE
BUN SERPL-MCNC: 28 MG/DL (ref 6–23)
CALCIUM SERPL-MCNC: 10.1 MG/DL (ref 8.6–10.2)
CHLORIDE SERPL-SCNC: 105 MMOL/L (ref 98–107)
CLARITY UR: CLEAR
CO2 SERPL-SCNC: 23 MMOL/L (ref 22–29)
COLOR UR: YELLOW
CREAT SERPL-MCNC: 1.5 MG/DL (ref 0.7–1.2)
CREAT UR-MCNC: 102.7 MG/DL (ref 40–278)
EOSINOPHIL # BLD: 0.27 K/UL (ref 0.05–0.5)
EOSINOPHILS RELATIVE PERCENT: 4 % (ref 0–6)
ERYTHROCYTE [DISTWIDTH] IN BLOOD BY AUTOMATED COUNT: 14.5 % (ref 11.5–15)
GFR SERPL CREATININE-BSD FRML MDRD: 45 ML/MIN/1.73M2
GLUCOSE SERPL-MCNC: 122 MG/DL (ref 74–99)
GLUCOSE UR STRIP-MCNC: >=1000 MG/DL
HBA1C MFR BLD: 6.3 % (ref 4–5.6)
HCT VFR BLD AUTO: 39.3 % (ref 37–54)
HGB BLD-MCNC: 13 G/DL (ref 12.5–16.5)
HGB UR QL STRIP.AUTO: NEGATIVE
IMM GRANULOCYTES # BLD AUTO: 0.03 K/UL (ref 0–0.58)
IMM GRANULOCYTES NFR BLD: 1 % (ref 0–5)
KETONES UR STRIP-MCNC: NEGATIVE MG/DL
LEUKOCYTE ESTERASE UR QL STRIP: NEGATIVE
LYMPHOCYTES NFR BLD: 2.28 K/UL (ref 1.5–4)
LYMPHOCYTES RELATIVE PERCENT: 35 % (ref 20–42)
MAGNESIUM SERPL-MCNC: 2.1 MG/DL (ref 1.6–2.6)
MCH RBC QN AUTO: 32.3 PG (ref 26–35)
MCHC RBC AUTO-ENTMCNC: 33.1 G/DL (ref 32–34.5)
MCV RBC AUTO: 97.8 FL (ref 80–99.9)
MICROALBUMIN UR-MCNC: 19 MG/L (ref 0–19)
MICROALBUMIN/CREAT UR-RTO: 19 MCG/MG CREAT (ref 0–30)
MONOCYTES NFR BLD: 0.64 K/UL (ref 0.1–0.95)
MONOCYTES NFR BLD: 10 % (ref 2–12)
NEUTROPHILS NFR BLD: 50 % (ref 43–80)
NEUTS SEG NFR BLD: 3.23 K/UL (ref 1.8–7.3)
NITRITE UR QL STRIP: NEGATIVE
PH UR STRIP: 5 [PH] (ref 5–9)
PHOSPHATE SERPL-MCNC: 3.3 MG/DL (ref 2.5–4.5)
PLATELET # BLD AUTO: 221 K/UL (ref 130–450)
PMV BLD AUTO: 9.8 FL (ref 7–12)
POTASSIUM SERPL-SCNC: 4.9 MMOL/L (ref 3.5–5)
PROT SERPL-MCNC: 8.1 G/DL (ref 6.4–8.3)
PROT UR STRIP-MCNC: NEGATIVE MG/DL
PTH-INTACT SERPL-MCNC: 24.7 PG/ML (ref 15–65)
RBC # BLD AUTO: 4.02 M/UL (ref 3.8–5.8)
RBC #/AREA URNS HPF: ABNORMAL /HPF
SODIUM SERPL-SCNC: 139 MMOL/L (ref 132–146)
SP GR UR STRIP: 1.02 (ref 1–1.03)
TOTAL PROTEIN, URINE: 12 MG/DL (ref 0–12)
URATE SERPL-MCNC: 5.6 MG/DL (ref 3.4–7)
UROBILINOGEN UR STRIP-ACNC: 0.2 EU/DL (ref 0–1)
WBC #/AREA URNS HPF: ABNORMAL /HPF
WBC OTHER # BLD: 6.5 K/UL (ref 4.5–11.5)

## 2023-08-09 PROCEDURE — 36415 COLL VENOUS BLD VENIPUNCTURE: CPT

## 2023-08-09 PROCEDURE — 83036 HEMOGLOBIN GLYCOSYLATED A1C: CPT

## 2023-08-09 PROCEDURE — 84156 ASSAY OF PROTEIN URINE: CPT

## 2023-08-09 PROCEDURE — 80053 COMPREHEN METABOLIC PANEL: CPT

## 2023-08-09 PROCEDURE — 82570 ASSAY OF URINE CREATININE: CPT

## 2023-08-09 PROCEDURE — 84100 ASSAY OF PHOSPHORUS: CPT

## 2023-08-09 PROCEDURE — 83970 ASSAY OF PARATHORMONE: CPT

## 2023-08-09 PROCEDURE — 82306 VITAMIN D 25 HYDROXY: CPT

## 2023-08-09 PROCEDURE — 82043 UR ALBUMIN QUANTITATIVE: CPT

## 2023-08-09 PROCEDURE — 83735 ASSAY OF MAGNESIUM: CPT

## 2023-08-09 PROCEDURE — 84550 ASSAY OF BLOOD/URIC ACID: CPT

## 2023-08-09 PROCEDURE — 81001 URINALYSIS AUTO W/SCOPE: CPT

## 2023-08-09 PROCEDURE — 85025 COMPLETE CBC W/AUTO DIFF WBC: CPT

## 2023-09-05 RX ORDER — ATORVASTATIN CALCIUM 80 MG/1
TABLET, FILM COATED ORAL
Qty: 90 TABLET | Refills: 1 | Status: SHIPPED | OUTPATIENT
Start: 2023-09-05

## 2023-09-13 RX ORDER — METOPROLOL TARTRATE 50 MG/1
TABLET, FILM COATED ORAL
Qty: 180 TABLET | Refills: 3 | Status: SHIPPED | OUTPATIENT
Start: 2023-09-13

## 2023-09-22 RX ORDER — ASPIRIN 81 MG
TABLET,CHEWABLE ORAL
Qty: 90 TABLET | Refills: 3 | Status: SHIPPED | OUTPATIENT
Start: 2023-09-22

## 2023-10-30 ENCOUNTER — TELEPHONE (OUTPATIENT)
Dept: CARDIOLOGY CLINIC | Age: 88
End: 2023-10-30

## 2023-10-30 ENCOUNTER — OFFICE VISIT (OUTPATIENT)
Dept: CARDIOLOGY CLINIC | Age: 88
End: 2023-10-30
Payer: MEDICARE

## 2023-10-30 VITALS
HEIGHT: 69 IN | DIASTOLIC BLOOD PRESSURE: 62 MMHG | WEIGHT: 180.6 LBS | BODY MASS INDEX: 26.75 KG/M2 | OXYGEN SATURATION: 99 % | HEART RATE: 50 BPM | RESPIRATION RATE: 20 BRPM | SYSTOLIC BLOOD PRESSURE: 140 MMHG

## 2023-10-30 DIAGNOSIS — E78.49 OTHER HYPERLIPIDEMIA: ICD-10-CM

## 2023-10-30 DIAGNOSIS — I25.10 CORONARY ARTERY DISEASE INVOLVING NATIVE CORONARY ARTERY OF NATIVE HEART WITHOUT ANGINA PECTORIS: Primary | ICD-10-CM

## 2023-10-30 DIAGNOSIS — N18.32 STAGE 3B CHRONIC KIDNEY DISEASE (HCC): ICD-10-CM

## 2023-10-30 DIAGNOSIS — I10 PRIMARY HYPERTENSION: ICD-10-CM

## 2023-10-30 PROCEDURE — G8427 DOCREV CUR MEDS BY ELIG CLIN: HCPCS | Performed by: INTERNAL MEDICINE

## 2023-10-30 PROCEDURE — 1123F ACP DISCUSS/DSCN MKR DOCD: CPT | Performed by: INTERNAL MEDICINE

## 2023-10-30 PROCEDURE — 1036F TOBACCO NON-USER: CPT | Performed by: INTERNAL MEDICINE

## 2023-10-30 PROCEDURE — G8417 CALC BMI ABV UP PARAM F/U: HCPCS | Performed by: INTERNAL MEDICINE

## 2023-10-30 PROCEDURE — 99214 OFFICE O/P EST MOD 30 MIN: CPT | Performed by: INTERNAL MEDICINE

## 2023-10-30 PROCEDURE — G8484 FLU IMMUNIZE NO ADMIN: HCPCS | Performed by: INTERNAL MEDICINE

## 2023-10-30 PROCEDURE — 93000 ELECTROCARDIOGRAM COMPLETE: CPT | Performed by: INTERNAL MEDICINE

## 2023-10-30 NOTE — PATIENT INSTRUCTIONS
Continue all your medications at current doses. Check lipid panel. Restrict sodium intake to less than 2-2.5 g/day. Restrict fluid intake to less than 2.2 L/day. Goal BP is less than 130/80. Please try to exercise for 150 minutes a week. I will see you back in the office in 6 months. Please call the office at ([phone], option 2) if you have any questions.

## 2023-10-30 NOTE — TELEPHONE ENCOUNTER
----- Message from Kellen Magana MD sent at 10/30/2023  1:16 PM EDT -----  I added a lipid panel for him. Can you let him know? If he has had a recent lipid panel done with his PCP, we can just have it faxed to our office for review.

## 2023-10-30 NOTE — PROGRESS NOTES
[address] [place]   Heart and Vascular [place]   Clinic Note     Date:10/30/23   Patient Name:Júnior Alonso  YOB: 1935  Age: 82 y.o. Primary Care Provider: Waldo Velasquez DO    Subjective     68-year-old  male returns for follow-up. He still has some dyspnea with significant exertion but is overall improved compared to last assessment. He has not had any chest pain, orthopnea, PND, palpitations, syncope, presyncope, lower extremity edema. He is compliant with his medications. He has not had any pathologic bleeding. He was prescribed Raquel Velasco by his nephrologist.  At his last visit, I had referred him to prescription assistance. The drug is much more affordable now. No visits to the emergency room or hospital since his last visit with me. Stable functional capacity. Accompanied by his wife today. A focused history review includes:  CAD with acute coronary syndrome status post unsuccessful PCI in 1998 requiring emergent CABG with LIMA to LAD, SVG to RCA (Dr. 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 Jersey Og  Hypertension  Hyperlipidemia  Former smoker 10 pack years-quit 1964  CARLOS on CPAP  GERD  COVID-19 pneumonia December 2020 - 4G Tiffany negative    Echocardiogram from 10/12/2022: EF 65 to 70%. Grade 1 diastolic dysfunction. Mild concentric LVH.       Past History    Past Medical History:         Diagnosis Date    ACS (acute coronary syndrome) ([address]) 05/01/1998    CAD (coronary artery disease)     COVID-19 12/15/2020    Hyperlipidemia     Hypertension     Sleep apnea     Stroke ([address])     Type II or unspecified type diabetes mellitus without mention of complication, not stated as uncontrolled        Past Surgical History:  Past Surgical History:   Procedure

## 2023-11-28 ENCOUNTER — HOSPITAL ENCOUNTER (OUTPATIENT)
Age: 88
Discharge: HOME | End: 2023-11-28
Payer: MEDICARE

## 2023-11-28 LAB
25(OH)D3 SERPL-MCNC: 39.2 NG/ML (ref 30–100)
ALBUMIN SERPL-MCNC: 4.5 G/DL (ref 3.5–5.2)
ALP SERPL-CCNC: 76 U/L (ref 40–129)
ALT SERPL-CCNC: 23 U/L (ref 0–40)
ANION GAP SERPL CALCULATED.3IONS-SCNC: 12 MMOL/L (ref 7–16)
AST SERPL-CCNC: 31 U/L (ref 0–39)
BASOPHILS # BLD: 0.04 K/UL (ref 0–0.2)
BASOPHILS NFR BLD: 1 % (ref 0–2)
BILIRUB SERPL-MCNC: 1.1 MG/DL (ref 0–1.2)
BILIRUB UR QL STRIP: NEGATIVE
BUN SERPL-MCNC: 25 MG/DL (ref 6–23)
CALCIUM SERPL-MCNC: 9.8 MG/DL (ref 8.6–10.2)
CHLORIDE SERPL-SCNC: 106 MMOL/L (ref 98–107)
CLARITY UR: CLEAR
CO2 SERPL-SCNC: 22 MMOL/L (ref 22–29)
COLOR UR: YELLOW
CREAT SERPL-MCNC: 1.6 MG/DL (ref 0.7–1.2)
CREAT UR-MCNC: 84.5 MG/DL (ref 40–278)
EOSINOPHIL # BLD: 0.22 K/UL (ref 0.05–0.5)
EOSINOPHILS RELATIVE PERCENT: 5 % (ref 0–6)
ERYTHROCYTE [DISTWIDTH] IN BLOOD BY AUTOMATED COUNT: 14.1 % (ref 11.5–15)
GFR SERPL CREATININE-BSD FRML MDRD: 40 ML/MIN/1.73M2
GLUCOSE SERPL-MCNC: 117 MG/DL (ref 74–99)
GLUCOSE UR STRIP-MCNC: 500 MG/DL
HCT VFR BLD AUTO: 37.8 % (ref 37–54)
HGB BLD-MCNC: 12.7 G/DL (ref 12.5–16.5)
HGB UR QL STRIP.AUTO: NEGATIVE
IMM GRANULOCYTES # BLD AUTO: 0.03 K/UL (ref 0–0.58)
IMM GRANULOCYTES NFR BLD: 1 % (ref 0–5)
KETONES UR STRIP-MCNC: NEGATIVE MG/DL
LEUKOCYTE ESTERASE UR QL STRIP: NEGATIVE
LYMPHOCYTES NFR BLD: 2.03 K/UL (ref 1.5–4)
LYMPHOCYTES RELATIVE PERCENT: 42 % (ref 20–42)
MAGNESIUM SERPL-MCNC: 2 MG/DL (ref 1.6–2.6)
MCH RBC QN AUTO: 32.2 PG (ref 26–35)
MCHC RBC AUTO-ENTMCNC: 33.6 G/DL (ref 32–34.5)
MCV RBC AUTO: 95.7 FL (ref 80–99.9)
MICROALBUMIN UR-MCNC: <12 MG/L (ref 0–19)
MICROALBUMIN/CREAT UR-RTO: NORMAL MCG/MG CREAT (ref 0–30)
MONOCYTES NFR BLD: 0.55 K/UL (ref 0.1–0.95)
MONOCYTES NFR BLD: 11 % (ref 2–12)
NEUTROPHILS NFR BLD: 41 % (ref 43–80)
NEUTS SEG NFR BLD: 1.96 K/UL (ref 1.8–7.3)
NITRITE UR QL STRIP: NEGATIVE
PH UR STRIP: 5.5 [PH] (ref 5–9)
PLATELET # BLD AUTO: 179 K/UL (ref 130–450)
PMV BLD AUTO: 9.8 FL (ref 7–12)
POTASSIUM SERPL-SCNC: 4.2 MMOL/L (ref 3.5–5)
PROT SERPL-MCNC: 7.9 G/DL (ref 6.4–8.3)
PROT UR STRIP-MCNC: NEGATIVE MG/DL
PTH-INTACT SERPL-MCNC: 24.4 PG/ML (ref 15–65)
RBC # BLD AUTO: 3.95 M/UL (ref 3.8–5.8)
RBC #/AREA URNS HPF: ABNORMAL /HPF
SODIUM SERPL-SCNC: 140 MMOL/L (ref 132–146)
SP GR UR STRIP: 1.02 (ref 1–1.03)
TOTAL PROTEIN, URINE: 6 MG/DL (ref 0–12)
URATE SERPL-MCNC: 5.4 MG/DL (ref 3.4–7)
UROBILINOGEN UR STRIP-ACNC: 0.2 EU/DL (ref 0–1)
WBC #/AREA URNS HPF: ABNORMAL /HPF
WBC OTHER # BLD: 4.8 K/UL (ref 4.5–11.5)

## 2023-11-28 PROCEDURE — 85025 COMPLETE CBC W/AUTO DIFF WBC: CPT

## 2023-11-28 PROCEDURE — 80053 COMPREHEN METABOLIC PANEL: CPT

## 2023-11-28 PROCEDURE — 83970 ASSAY OF PARATHORMONE: CPT

## 2023-11-28 PROCEDURE — 36415 COLL VENOUS BLD VENIPUNCTURE: CPT

## 2023-11-28 PROCEDURE — 82570 ASSAY OF URINE CREATININE: CPT

## 2023-11-28 PROCEDURE — 82306 VITAMIN D 25 HYDROXY: CPT

## 2023-11-28 PROCEDURE — 84550 ASSAY OF BLOOD/URIC ACID: CPT

## 2023-11-28 PROCEDURE — 81001 URINALYSIS AUTO W/SCOPE: CPT

## 2023-11-28 PROCEDURE — 82043 UR ALBUMIN QUANTITATIVE: CPT

## 2023-11-28 PROCEDURE — 83735 ASSAY OF MAGNESIUM: CPT

## 2023-11-28 PROCEDURE — 84156 ASSAY OF PROTEIN URINE: CPT

## 2024-02-29 RX ORDER — AMLODIPINE BESYLATE 5 MG/1
TABLET ORAL
Qty: 90 TABLET | Refills: 3 | Status: SHIPPED | OUTPATIENT
Start: 2024-02-29

## 2024-02-29 RX ORDER — ATORVASTATIN CALCIUM 80 MG/1
TABLET, FILM COATED ORAL
Qty: 90 TABLET | Refills: 1 | Status: SHIPPED | OUTPATIENT
Start: 2024-02-29

## 2024-04-24 ENCOUNTER — OFFICE VISIT (OUTPATIENT)
Dept: CARDIOLOGY CLINIC | Age: 89
End: 2024-04-24
Payer: MEDICARE

## 2024-04-24 VITALS
WEIGHT: 186 LBS | SYSTOLIC BLOOD PRESSURE: 126 MMHG | HEIGHT: 69 IN | DIASTOLIC BLOOD PRESSURE: 60 MMHG | BODY MASS INDEX: 27.55 KG/M2 | OXYGEN SATURATION: 98 % | HEART RATE: 55 BPM | RESPIRATION RATE: 18 BRPM

## 2024-04-24 DIAGNOSIS — I25.10 CORONARY ARTERY DISEASE INVOLVING NATIVE CORONARY ARTERY OF NATIVE HEART WITHOUT ANGINA PECTORIS: Primary | ICD-10-CM

## 2024-04-24 DIAGNOSIS — E78.49 OTHER HYPERLIPIDEMIA: ICD-10-CM

## 2024-04-24 DIAGNOSIS — G47.33 OSA ON CPAP: ICD-10-CM

## 2024-04-24 DIAGNOSIS — I10 PRIMARY HYPERTENSION: ICD-10-CM

## 2024-04-24 DIAGNOSIS — Z95.1 S/P CABG X 2: ICD-10-CM

## 2024-04-24 PROCEDURE — G8427 DOCREV CUR MEDS BY ELIG CLIN: HCPCS | Performed by: INTERNAL MEDICINE

## 2024-04-24 PROCEDURE — 1123F ACP DISCUSS/DSCN MKR DOCD: CPT | Performed by: INTERNAL MEDICINE

## 2024-04-24 PROCEDURE — 93000 ELECTROCARDIOGRAM COMPLETE: CPT | Performed by: INTERNAL MEDICINE

## 2024-04-24 PROCEDURE — 99214 OFFICE O/P EST MOD 30 MIN: CPT | Performed by: INTERNAL MEDICINE

## 2024-04-24 PROCEDURE — G8417 CALC BMI ABV UP PARAM F/U: HCPCS | Performed by: INTERNAL MEDICINE

## 2024-04-24 PROCEDURE — 1036F TOBACCO NON-USER: CPT | Performed by: INTERNAL MEDICINE

## 2024-04-24 NOTE — PROGRESS NOTES
Ice chips, popsicles, lozenges for throat pain    Plenty of fluids.    Tylenol or Advil if pain.    Eyedrops as instructed.    Follow-up and throat culture in 3 days.    Recheck if worsening throat pain or no improvement in throat or eyes in 3 days.  Patient Education     Pharyngitis (Sore Throat), Report Pending     Pharyngitis (sore throat) is often due to a virus. It can also be caused by strep (streptococcus) bacteria. This is often called strep throat. Both viral and strep infections can cause throat pain that is worse when swallowing, aching all over, headache, and fever. Both types of infections are contagious. They may be spread by coughing, kissing, or touching others after touching your mouth or nose.   A test has been done to find out if you or your child have strep throat. Often a rapid strep test can be done which gives immediate results and treatment can begin right away. A throat culture may also be done. The culture results take longer. If you have a virus, the culture results will be negative. The facility will call with your culture results. Call this facility or your healthcare provider if you were not given your rapid test results for strep. If a test is positive for strep infection, you will need to take an antibiotic. An antibiotic is a medicine used to treat a bacterial infection. A prescription can be called into your pharmacy or a written copy will be given to you at that time. If both tests are negative, you likely have a virus, usually viral pharyngitis. This does not need to be treated with antibiotics. Until you receive the results of the rapid strep test or the throat culture, you should stay home from work. If your child is being tested, he or she should stay home from school.   Home care  Rest at home. Drink plenty of fluids so you won't get dehydrated.  If the test is positive for strep, you or your child should not go to work or school for the first 24 hours of taking the  antibiotics or as directed by the healthcare provider. After this time, you or your child will not be contagious. You or your child can then return to work or school when feeling better or as directed by this facility or your healthcare provider.   Use the antibiotic medicine (often penicillin or amoxicillin) for the full 10 days or as directed by the healthcare provider. Don't stop the medicine even if you or your child feel better. This is very important to make sure the infection is fully treated. It's also important to prevent medicine-resistant germs from growing. Treatment for 10 days is also the best way to prevent rheumatic fever which affects the heart and other parts of the body. If you or your child were given an antibiotic shot, the healthcare provider will tell you if additional antibiotics are needed.  Use throat lozenges or numbing throat sprays to help reduce pain. Gargling with warm salt water will also help reduce throat pain. Dissolve 1/2 teaspoon of salt in 1 glass of warm water. Discuss this treatment with your healthcare provider.  Children can sip on juice or ice pops. Children 5 years and older can also suck on a lollipop or hard candy.  Don't eat salty or spicy foods or give them to your child. These can irritate the throat.  Other medicine for a child:  You can give your child acetaminophen for fever, fussiness, or discomfort. In babies over 6 months of age, you may use ibuprofen instead of acetaminophen. If your child has chronic liver or kidney disease or ever had a stomach ulcer or gastrointestinal bleeding, talk with your child s healthcare provider before giving these medicines. Aspirin should never be used by any child under 18 years of age who has a fever. It may cause severe liver damage. Always contact your child's healthcare provider before giving him or her over-the-counter medicines for the first time.   Other medicine for an adult: You may use acetaminophen or ibuprofen to  "control pain or fever, unless another medicine was prescribed for this. If you have chronic liver or kidney disease or ever had a stomach ulcer or gastrointestinal bleeding, talk with your healthcare provider before using these medicines.   Follow-up care  Follow up with your healthcare provider or our staff if you or your child don't feel or get better within 72 hours or as directed.   When to get medical advice  Call your healthcare provider right away if any of these occur:   Your child has a fever (see \"Fever and children,\" below)  You have a fever of 100.4 F (38 C) or higher, or as directed  New or worsening ear pain, sinus pain, or headache  Painful lumps in the back of neck  Stiff or swollen neck  Lymph nodes are getting larger or swelling of the neck  Can t open mouth wide due to throat pain  Signs of dehydration, such as very dark urine or no urine or sunken eyes  Noisy breathing  Muffled voice  New rash  Symptoms are worse  New symptoms develop  Call 911  Call 911 if you have any of the following symptoms   Can't swallow, especially saliva, with a lot of drooling  Trouble or difficulty breathing or wheezing  Feeling faint or dizzy  Can't talk  Feeling of doom  Prevention  Here are steps you can take to help prevent an infection:   Keep good hand washing habits.  Don t have close contact with people who have sore throats, colds, or other upper respiratory infections.  Don t smoke, and stay away from secondhand smoke.  Stay up to date with of your vaccines.  Fever and children  Use a digital thermometer to check your child s temperature. Don t use a mercury thermometer. There are different kinds and uses of digital thermometers. They include:   Rectal. For children younger than 3 years, a rectal temperature is the most accurate.  Forehead (temporal). This works for children age 3 months and older. If a child under 3 months old has signs of illness, this can be used for a first pass. The provider may want to " MG tablet TAKE 1 TABLET BY MOUTH EVERY DAY (Patient not taking: Reported on 4/24/2024) 90 tablet 1    albuterol sulfate  (90 Base) MCG/ACT inhaler Inhale 2 puffs into the lungs every 6 hours (Patient not taking: Reported on 10/30/2023) 1 Inhaler 3    Cholecalciferol (VITAMIN D3) 5000 units TABS Take 5,000 Units by mouth daily (Patient not taking: Reported on 3/7/2023)       No current facility-administered medications for this visit.           Physical Examination      /60   Pulse 55   Resp 18   Ht 1.753 m (5' 9\")   Wt 84.4 kg (186 lb)   SpO2 98%   BMI 27.47 kg/m²   Body surface area is 2.03 meters squared.     General: No acute distress, appears as stated age, nonicteric  Head: Atraumatic, no gross abnormalities or bruises  Neck: Supple and nontender, no carotid bruits, normal JVP  Lungs: Clear to auscultation bilaterally, no wheezes, rales, or rhonchi  Heart: Regular rate and rhythm.  2/6 systolic ejection murmur over the upper right sternal border  Abdomen: Soft, nontender, nondistended, normal bowel sounds  Extremities: No obvious deformities, no cyanosis, no pitting edema  Neurological: Alert and oriented x3, EOMI, moving all extremities x4  Psychological: Normal mood and affect, cooperative  Skin: Color, texture, and turgor normal for age          Labs/Imaging/Diagnostics   Personally reviewed:    Lab Results   Component Value Date/Time     11/28/2023 10:04 AM    K 4.2 11/28/2023 10:04 AM    K 4.2 12/20/2020 06:00 AM     11/28/2023 10:04 AM    CO2 22 11/28/2023 10:04 AM    BUN 25 11/28/2023 10:04 AM    CREATININE 1.6 11/28/2023 10:04 AM    GLUCOSE 117 11/28/2023 10:04 AM    CALCIUM 9.8 11/28/2023 10:04 AM        Estimated Creatinine Clearance: 33 mL/min (A) (based on SCr of 1.6 mg/dL (H)).    Lab Results   Component Value Date    WBC 4.8 11/28/2023    HGB 12.7 11/28/2023    HCT 37.8 11/28/2023    MCV 95.7 11/28/2023     11/28/2023       Lab Results   Component Value Date  confirm with a rectal temperature.  Ear (tympanic). Ear temperatures are accurate after 6 months of age, but not before.  Armpit (axillary). This is the least reliable but may be used for a first pass to check a child of any age with signs of illness. The provider may want to confirm with a rectal temperature.  Mouth (oral). Don t use a thermometer in your child s mouth until he or she is at least 4 years old.  Use the rectal thermometer with care. Follow the product maker s directions for correct use. Insert it gently. Label it and make sure it s not used in the mouth. It may pass on germs from the stool. If you don t feel OK using a rectal thermometer, ask the healthcare provider what type to use instead. When you talk with any healthcare provider about your child s fever, tell him or her which type you used.   Below are guidelines to know if your young child has a fever. Your child s healthcare provider may give you different numbers for your child. Follow your provider s specific instructions.   Fever readings for a baby under 3 months old:   First, ask your child s healthcare provider how you should take the temperature.  Rectal or forehead: 100.4 F (38 C) or higher  Armpit: 99 F (37.2 C) or higher  Fever readings for a child age 3 months to 36 months (3 years):   Rectal, forehead, or ear: 102 F (38.9 C) or higher  Armpit: 101 F (38.3 C) or higher  Call the healthcare provider in these cases:  Repeated temperature of 104 F (40 C) or higher in a child of any age  Fever of 100.4  (38 C) or higher in a baby younger than 3 months  Fever that lasts more than 24 hours in a child under age 2  Fever that lasts for 3 days in a child age 2 or older    [employer] last reviewed this educational content on 2/1/2020 2000-2021 [employer]. All rights reserved. This information is not intended as a substitute for professional medical care. Always follow your healthcare professional's instructions.            Patient Education     Bacterial Conjunctivitis    You have an infection in the membranes covering the white part of the eye. This part of the eye is called the conjunctiva. The infection is called conjunctivitis. The most common symptoms of conjunctivitis include a thick, pus-like discharge from the eye, swollen eyelids, redness, eyelids sticking together upon awakening, and a gritty or scratchy feeling in the eye. Your infection was caused by bacteria. It may be treated with medicine. With treatment, the infection takes about 7 to 10 days to resolve.   Home care  Use prescribed antibiotic eye drops or ointment as directed to treat the infection.  Apply a warm compress (towel soaked in warm water) to the affected eye 3 to 4 times a day. Do this just before applying medicine to the eye.  Use a warm, wet cloth to wipe away crusting of the eyelids in the morning. This is caused by mucus drainage during the night. You may also use saline irrigating solution or artificial tears to rinse away mucus in the eye. Do not put a patch over the eye.  Wash your hands before and after touching the infected eye. This is to prevent spreading the infection to the other eye, and to other people. Don't share your towels or washcloths with others.  You may use acetaminophen or ibuprofen to control pain, unless another medicine was prescribed. Talk with your healthcare provider before using these medicines if you have chronic liver or kidney disease. Also talk with your provider if you have ever had a stomach ulcer or digestive bleeding.  Don't wear contact lenses until your eyes have healed and all symptoms are gone.    Follow-up care  Follow up with your healthcare provider, or as advised.  When to seek medical advice  Call your healthcare provider right away if any of these occur:  Worsening vision  Increasing pain in the eye  Increasing swelling or redness of the eyelid  Redness spreading around the eye  [employer] last reviewed  this educational content on 4/1/2020 2000-2021 [employer]. All rights reserved. This information is not intended as a substitute for professional medical care. Always follow your healthcare professional's instructions.

## 2024-04-24 NOTE — PATIENT INSTRUCTIONS
Continue all your medications at current doses.   Restrict sodium intake to less than 2-2.5 g/day. Restrict fluid intake to less than 2.2 L/day. Goal BP is less than 130/80.   If your weight increases by > 2 lbs in a day or >5 lbs in more than a day, take an extra lasix pill.   Please try to exercise for 150 minutes a week.   I will see you back in the office in 6 months. Please call the office at ([phone], option 2) if you have any questions.

## 2024-05-29 ENCOUNTER — HOSPITAL ENCOUNTER (OUTPATIENT)
Age: 89
Discharge: HOME | End: 2024-05-29
Payer: MEDICARE

## 2024-05-29 LAB
25(OH)D3 SERPL-MCNC: 33.6 NG/ML (ref 30–100)
ALBUMIN SERPL-MCNC: 4.4 G/DL (ref 3.5–5.2)
ALP SERPL-CCNC: 63 U/L (ref 40–129)
ALT SERPL-CCNC: 17 U/L (ref 0–40)
ANION GAP SERPL CALCULATED.3IONS-SCNC: 12 MMOL/L (ref 7–16)
AST SERPL-CCNC: 24 U/L (ref 0–39)
BASOPHILS # BLD: 0.06 K/UL (ref 0–0.2)
BASOPHILS NFR BLD: 1 % (ref 0–2)
BILIRUB SERPL-MCNC: 0.9 MG/DL (ref 0–1.2)
BILIRUB UR QL STRIP: NEGATIVE
CALCIUM SERPL-MCNC: 10 MG/DL (ref 8.6–10.2)
CHLORIDE SERPL-SCNC: 105 MMOL/L (ref 98–107)
CLARITY UR: CLEAR
CO2 SERPL-SCNC: 24 MMOL/L (ref 22–29)
COLOR UR: YELLOW
CREAT SERPL-MCNC: 1.6 MG/DL (ref 0.7–1.2)
CREAT UR-MCNC: 89 MG/DL (ref 40–278)
EOSINOPHIL # BLD: 0.25 K/UL (ref 0.05–0.5)
EOSINOPHILS RELATIVE PERCENT: 4 % (ref 0–6)
ERYTHROCYTE [DISTWIDTH] IN BLOOD BY AUTOMATED COUNT: 13.7 % (ref 11.5–15)
GFR, ESTIMATED: 41 ML/MIN/1.73M2
GLUCOSE SERPL-MCNC: 129 MG/DL (ref 74–99)
GLUCOSE UR STRIP-MCNC: >=1000 MG/DL
HCT VFR BLD AUTO: 38.2 % (ref 37–54)
HGB BLD-MCNC: 12.8 G/DL (ref 12.5–16.5)
HGB UR QL STRIP.AUTO: NEGATIVE
IMM GRANULOCYTES # BLD AUTO: 0.04 K/UL (ref 0–0.58)
IMM GRANULOCYTES NFR BLD: 1 % (ref 0–5)
KETONES UR STRIP-MCNC: NEGATIVE MG/DL
LEUKOCYTE ESTERASE UR QL STRIP: NEGATIVE
LYMPHOCYTES NFR BLD: 1.75 K/UL (ref 1.5–4)
LYMPHOCYTES RELATIVE PERCENT: 28 % (ref 20–42)
MAGNESIUM SERPL-MCNC: 2 MG/DL (ref 1.6–2.6)
MCH RBC QN AUTO: 32.7 PG (ref 26–35)
MCHC RBC AUTO-ENTMCNC: 33.5 G/DL (ref 32–34.5)
MCV RBC AUTO: 97.4 FL (ref 80–99.9)
MICROALBUMIN UR-MCNC: <12 MG/L (ref 0–19)
MICROALBUMIN/CREAT UR-RTO: NORMAL MCG/MG CREAT (ref 0–30)
MONOCYTES NFR BLD: 0.68 K/UL (ref 0.1–0.95)
MONOCYTES NFR BLD: 11 % (ref 2–12)
NEUTROPHILS NFR BLD: 55 % (ref 43–80)
NEUTS SEG NFR BLD: 3.41 K/UL (ref 1.8–7.3)
NITRITE UR QL STRIP: NEGATIVE
PH UR STRIP: 5.5 [PH] (ref 5–9)
PHOSPHATE SERPL-MCNC: 3.3 MG/DL (ref 2.5–4.5)
PLATELET # BLD AUTO: 192 K/UL (ref 130–450)
PMV BLD AUTO: 10 FL (ref 7–12)
POTASSIUM SERPL-SCNC: 4.8 MMOL/L (ref 3.5–5)
PROT SERPL-MCNC: 7.7 G/DL (ref 6.4–8.3)
PROT UR STRIP-MCNC: NEGATIVE MG/DL
PTH-INTACT SERPL-MCNC: 21.2 PG/ML (ref 15–65)
RBC # BLD AUTO: 3.92 M/UL (ref 3.8–5.8)
RBC #/AREA URNS HPF: ABNORMAL /HPF
SODIUM SERPL-SCNC: 141 MMOL/L (ref 132–146)
SP GR UR STRIP: 1.02 (ref 1–1.03)
TOTAL PROTEIN, URINE: 8 MG/DL (ref 0–12)
URATE SERPL-MCNC: 5.2 MG/DL (ref 3.4–7)
UROBILINOGEN UR STRIP-ACNC: 0.2 EU/DL (ref 0–1)
WBC #/AREA URNS HPF: ABNORMAL /HPF
WBC OTHER # BLD: 6.2 K/UL (ref 4.5–11.5)

## 2024-05-29 PROCEDURE — 83735 ASSAY OF MAGNESIUM: CPT

## 2024-05-29 PROCEDURE — 84100 ASSAY OF PHOSPHORUS: CPT

## 2024-05-29 PROCEDURE — 80053 COMPREHEN METABOLIC PANEL: CPT

## 2024-05-29 PROCEDURE — 84156 ASSAY OF PROTEIN URINE: CPT

## 2024-05-29 PROCEDURE — 81001 URINALYSIS AUTO W/SCOPE: CPT

## 2024-05-29 PROCEDURE — 85025 COMPLETE CBC W/AUTO DIFF WBC: CPT

## 2024-05-29 PROCEDURE — 82570 ASSAY OF URINE CREATININE: CPT

## 2024-05-29 PROCEDURE — 82043 UR ALBUMIN QUANTITATIVE: CPT

## 2024-05-29 PROCEDURE — 36415 COLL VENOUS BLD VENIPUNCTURE: CPT

## 2024-05-29 PROCEDURE — 83036 HEMOGLOBIN GLYCOSYLATED A1C: CPT

## 2024-05-29 PROCEDURE — 83970 ASSAY OF PARATHORMONE: CPT

## 2024-05-29 PROCEDURE — 82306 VITAMIN D 25 HYDROXY: CPT

## 2024-05-29 PROCEDURE — 84550 ASSAY OF BLOOD/URIC ACID: CPT

## 2024-08-19 RX ORDER — ATORVASTATIN CALCIUM 80 MG/1
TABLET, FILM COATED ORAL
Qty: 90 TABLET | Refills: 1 | Status: SHIPPED | OUTPATIENT
Start: 2024-08-19

## 2024-08-19 NOTE — LETTER
Beneficiary Notification Letter  BPCI Advanced     Your Doctor or [address],    We wanted to let you know that your health care provider, [address] [place] [address] Sandra, has volunteered to take part in our [facility] for [facility] Medicaid Services (CMS) Bundled Payments for [facility] (BPCI Advanced). This doesnt change your Medicare rights or benefits and you dont need to do anything. What are bundled payments? A bundled payment combines, or bundles together, payments that Medicare makes to your health care providers for the many different kinds of medical services you might get in a specific time period. In BPCI Advanced, this time period could include a hospital inpatient stay or outpatient procedure, plus 90 days. Why would Medicare bundle payments? Bundled payments are thought of as a value-based way to pay because health care providers are responsible for both the quality and cost of medical care they give. This is a relatively new way of paying health care providers compared to thefee-for-service way Medicare has traditionally paid, where providers are paid separately for each service they provide. Bundled payments encourage these providers to work together to provide better, more coordinated care during your hospital stay, or outpatient procedure, and through your recovery. What does BPCI Advance mean for me? Youre more likely to get even better care when hospitals, doctors, and other health care providers work together. In BPCI Advanced, hospitals, doctors, and other health care providers may be rewarded for providing better, more coordinated health care. Medicare will watch BPCI Advanced participants closely to make sure that you and other patients keep getting efficient, high quality care. What do I need to know about BPCI Advanced? Whats most important for you to know is that your Medicare rights and benefits wont change because your health care provider is participating in [address]. Medicare will keep covering all of your medically necessary services. Even though Medicare will pay your doctor in a different way under BPCI Advanced, how much you have to pay wont change. Health care providers and suppliers who are enrolled in Medicare will submit their Medicare claims like they always have. Youll have all the same Medicare rights and protections, including the right to choose which hospital, doctor, or other health care provider you see. If you dont want to get care from a health care provider whos participating in [address], then youll have to choose a different health care provider whos not participating in the Model. How can I give feedback about my health care? Medicare might ask you to take a voluntary survey about the services and care you received from [address] [place] [address] during your hospital stay or outpatient procedure and for a specific period of time afterwards. You can decide whether you want to take the voluntary survey, but if you do, itll help Medicare make BPCI Advanced and the care of other Medicare patients better. If you have concerns or complaints about your care, you can:   · Talk to your doctor or health care provider. · Contact your Beneficiary and Family Centered Care Quality Improvement   Organization [facility]). You can get your [facility]s phone number  at  Medicare.gov/contacts or by calling 1-800-MEDICARE. TTY users can call  3-711.798.1907. Where can I learn more about BPCI Advanced? Learn more about BPCI Advanced at https://innovation.cms.gov/initiatives/bpci-advanced/:  · A list of all the hospitals and physician group practices in the country participating in [address]. hard copy, drawn during this pregnancy

## 2024-10-25 ENCOUNTER — OFFICE VISIT (OUTPATIENT)
Dept: CARDIOLOGY CLINIC | Age: 89
End: 2024-10-25

## 2024-10-25 VITALS
BODY MASS INDEX: 26.72 KG/M2 | WEIGHT: 180.4 LBS | DIASTOLIC BLOOD PRESSURE: 58 MMHG | SYSTOLIC BLOOD PRESSURE: 134 MMHG | HEIGHT: 69 IN | HEART RATE: 72 BPM

## 2024-10-25 DIAGNOSIS — E78.49 OTHER HYPERLIPIDEMIA: ICD-10-CM

## 2024-10-25 DIAGNOSIS — I10 PRIMARY HYPERTENSION: ICD-10-CM

## 2024-10-25 DIAGNOSIS — I35.0 NONRHEUMATIC AORTIC VALVE STENOSIS: ICD-10-CM

## 2024-10-25 DIAGNOSIS — I25.10 CORONARY ARTERY DISEASE INVOLVING NATIVE CORONARY ARTERY OF NATIVE HEART WITHOUT ANGINA PECTORIS: Primary | ICD-10-CM

## 2024-10-25 NOTE — PATIENT INSTRUCTIONS
Continue all your medications at current doses.   We will schedule an echocardiogram.   Restrict sodium intake to less than 2-2.5 g/day. Restrict fluid intake to less than 2.2 L/day. Goal BP is less than 130/80.   Please try to exercise for 150 minutes a week.   I will see you back in the office in 6 months. Please call the office at ([phone], option [phone]) if you have any questions.

## 2024-10-25 NOTE — PROGRESS NOTES
05/29/2024    AST 24 05/29/2024    ALKPHOS 63 05/29/2024    BILITOT 0.9 05/29/2024       Lab Results   Component Value Date    LABPROT 0.1 04/12/2023    LABPROT 0.1 04/12/2023     Lab Results   Component Value Date    CHOL 147 01/10/2023    CHOL 116 11/23/2021     Lab Results   Component Value Date    TRIG 88 01/10/2023    TRIG 128 11/23/2021     Lab Results   Component Value Date    HDL 43 01/10/2023    HDL 37 11/23/2021     No components found for: \"LDLCHOLESTEROL\", \"LDLCALC\"    Lab Results   Component Value Date    VLDL 18 01/10/2023    VLDL 26 11/23/2021             Personally interpreted the following studies.:  EKG: None today.  Sinus bradycardia with left anterior fascicular block, IVCD, LVH, nonspecific ST-T changes.  Heart rate is 55 bpm.        Assessment and Plan:        88-year-old  male with NYHA class I dyspnea on exertion who is euvolemic today and has no angina or symptoms of arrhythmia.       Diagnosis Orders   1. Coronary artery disease involving native coronary artery of native heart without angina pectoris  EKG 12 lead      2. Nonrheumatic aortic valve stenosis  Echo (TTE) Complete      3. Other hyperlipidemia        4. Primary hypertension             Continue aspirin 81 mg daily  His LDL from 1/10/2023 is at 86.  Goal is less than 60.  He was switched to Pravachol by his nephrologist.   Continue metoprolol tartrate 50 mg p.o. twice daily  He is now on amlodipine 5 mg daily..  Goal blood pressure less than 140/90 given his age.  His pressures appear to be at goal.  Otherwise, stable and euvolemic from a cardiovascular standpoint.  He continues to have a systolic murmur.  Does have a history of moderate aortic stenosis.  Recheck echocardiogram to assess biventricular function and gradients across the aortic valve.      Assessment and plan reviewed with the patient/family and their questions and concerns answered in full.    Follow up with me in 6 months    We appreciate the opportunity

## 2024-10-28 RX ORDER — ASPIRIN 81 MG
TABLET,CHEWABLE ORAL
Qty: 90 TABLET | Refills: 3 | Status: SHIPPED | OUTPATIENT
Start: 2024-10-28

## 2024-11-26 ENCOUNTER — HOSPITAL ENCOUNTER (OUTPATIENT)
Age: 89
Discharge: HOME | End: 2024-11-26
Payer: MEDICARE

## 2024-11-26 LAB
25(OH)D3 SERPL-MCNC: 32.2 NG/ML (ref 30–100)
ALBUMIN SERPL-MCNC: 4.3 G/DL (ref 3.5–5.2)
ALP SERPL-CCNC: 74 U/L (ref 40–129)
ALT SERPL-CCNC: 16 U/L (ref 0–40)
ANION GAP SERPL CALCULATED.3IONS-SCNC: 11 MMOL/L (ref 7–16)
AST SERPL-CCNC: 29 U/L (ref 0–39)
BASOPHILS # BLD: 0.08 K/UL (ref 0–0.2)
BASOPHILS NFR BLD: 1 % (ref 0–2)
BILIRUB SERPL-MCNC: 1.2 MG/DL (ref 0–1.2)
BILIRUB UR QL STRIP: NEGATIVE
BUN SERPL-MCNC: 22 MG/DL (ref 6–23)
CALCIUM SERPL-MCNC: 10.2 MG/DL (ref 8.6–10.2)
CHLORIDE SERPL-SCNC: 107 MMOL/L (ref 98–107)
CLARITY UR: CLEAR
CO2 SERPL-SCNC: 24 MMOL/L (ref 22–29)
COLOR UR: YELLOW
CREAT SERPL-MCNC: 1.6 MG/DL (ref 0.7–1.2)
CREAT UR-MCNC: 172.6 MG/DL (ref 40–278)
EOSINOPHIL # BLD: 0.22 K/UL (ref 0.05–0.5)
EOSINOPHILS RELATIVE PERCENT: 3 % (ref 0–6)
ERYTHROCYTE [DISTWIDTH] IN BLOOD BY AUTOMATED COUNT: 13.9 % (ref 11.5–15)
GFR, ESTIMATED: 41 ML/MIN/1.73M2
GLUCOSE SERPL-MCNC: 112 MG/DL (ref 74–99)
GLUCOSE UR STRIP-MCNC: >=1000 MG/DL
HCT VFR BLD AUTO: 37.6 % (ref 37–54)
HGB BLD-MCNC: 12.8 G/DL (ref 12.5–16.5)
HGB UR QL STRIP.AUTO: NEGATIVE
IMM GRANULOCYTES # BLD AUTO: 0.04 K/UL (ref 0–0.58)
IMM GRANULOCYTES NFR BLD: 1 % (ref 0–5)
KETONES UR STRIP-MCNC: NEGATIVE MG/DL
LEUKOCYTE ESTERASE UR QL STRIP: NEGATIVE
LYMPHOCYTES NFR BLD: 2.12 K/UL (ref 1.5–4)
LYMPHOCYTES RELATIVE PERCENT: 30 % (ref 20–42)
MAGNESIUM SERPL-MCNC: 2.1 MG/DL (ref 1.6–2.6)
MCH RBC QN AUTO: 32.9 PG (ref 26–35)
MCHC RBC AUTO-ENTMCNC: 34 G/DL (ref 32–34.5)
MCV RBC AUTO: 96.7 FL (ref 80–99.9)
MICROALBUMIN UR-MCNC: 18 MG/L (ref 0–19)
MICROALBUMIN/CREAT UR-RTO: 10 MCG/MG CREAT (ref 0–30)
MONOCYTES NFR BLD: 0.74 K/UL (ref 0.1–0.95)
MONOCYTES NFR BLD: 11 % (ref 2–12)
NEUTROPHILS NFR BLD: 55 % (ref 43–80)
NEUTS SEG NFR BLD: 3.88 K/UL (ref 1.8–7.3)
NITRITE UR QL STRIP: NEGATIVE
PH UR STRIP: 5.5 [PH] (ref 5–9)
PHOSPHATE SERPL-MCNC: 3.1 MG/DL (ref 2.5–4.5)
PLATELET # BLD AUTO: 217 K/UL (ref 130–450)
PMV BLD AUTO: 10.3 FL (ref 7–12)
POTASSIUM SERPL-SCNC: 4.8 MMOL/L (ref 3.5–5)
PROT SERPL-MCNC: 7.7 G/DL (ref 6.4–8.3)
PROT UR STRIP-MCNC: NEGATIVE MG/DL
RBC # BLD AUTO: 3.89 M/UL (ref 3.8–5.8)
RBC #/AREA URNS HPF: ABNORMAL /HPF
SODIUM SERPL-SCNC: 142 MMOL/L (ref 132–146)
SP GR UR STRIP: >1.03 (ref 1–1.03)
TOTAL PROTEIN, URINE: 14 MG/DL (ref 0–12)
URATE SERPL-MCNC: 5.1 MG/DL (ref 3.4–7)
UROBILINOGEN UR STRIP-ACNC: 0.2 EU/DL (ref 0–1)
WBC #/AREA URNS HPF: ABNORMAL /HPF
WBC OTHER # BLD: 7.1 K/UL (ref 4.5–11.5)

## 2024-11-26 PROCEDURE — 82306 VITAMIN D 25 HYDROXY: CPT

## 2024-11-26 PROCEDURE — 84100 ASSAY OF PHOSPHORUS: CPT

## 2024-11-26 PROCEDURE — 83735 ASSAY OF MAGNESIUM: CPT

## 2024-11-26 PROCEDURE — 36415 COLL VENOUS BLD VENIPUNCTURE: CPT

## 2024-11-26 PROCEDURE — 82570 ASSAY OF URINE CREATININE: CPT

## 2024-11-26 PROCEDURE — 84156 ASSAY OF PROTEIN URINE: CPT

## 2024-11-26 PROCEDURE — 80053 COMPREHEN METABOLIC PANEL: CPT

## 2024-11-26 PROCEDURE — 84550 ASSAY OF BLOOD/URIC ACID: CPT

## 2024-11-26 PROCEDURE — 85025 COMPLETE CBC W/AUTO DIFF WBC: CPT

## 2024-11-26 PROCEDURE — 82043 UR ALBUMIN QUANTITATIVE: CPT

## 2024-11-26 PROCEDURE — 81001 URINALYSIS AUTO W/SCOPE: CPT

## 2024-12-10 ENCOUNTER — HOSPITAL ENCOUNTER (OUTPATIENT)
Dept: CARDIOLOGY | Age: 89
Discharge: HOME | End: 2024-12-12
Attending: INTERNAL MEDICINE
Payer: MEDICARE

## 2024-12-10 VITALS — HEIGHT: 69 IN | WEIGHT: 180 LBS | BODY MASS INDEX: 26.66 KG/M2

## 2024-12-10 DIAGNOSIS — I35.0 NONRHEUMATIC AORTIC VALVE STENOSIS: ICD-10-CM

## 2024-12-10 LAB
ECHO AR MAX VEL PISA: 3.3 M/S
ECHO AV AREA PEAK VELOCITY: 0.7 CM2
ECHO AV AREA VTI: 0.8 CM2
ECHO AV AREA/BSA PEAK VELOCITY: 0.4 CM2/M2
ECHO AV AREA/BSA VTI: 0.4 CM2/M2
ECHO AV CUSP MM: 1.1 CM
ECHO AV MEAN GRADIENT: 41 MMHG
ECHO AV MEAN VELOCITY: 3 M/S
ECHO AV PEAK GRADIENT: 64 MMHG
ECHO AV PEAK VELOCITY: 4 M/S
ECHO AV REGURGITANT PHT: 588.5 MILLISECOND
ECHO AV VELOCITY RATIO: 0.23
ECHO AV VTI: 109.4 CM
ECHO BSA: 1.99 M2
ECHO EST RA PRESSURE: 3 MMHG
ECHO LA DIAMETER INDEX: 2.93 CM/M2
ECHO LA DIAMETER: 5.8 CM
ECHO LA VOL A-L A2C: 123 ML (ref 18–58)
ECHO LA VOL A-L A4C: 120 ML (ref 18–58)
ECHO LA VOL MOD A2C: 115 ML (ref 18–58)
ECHO LA VOL MOD A4C: 114 ML (ref 18–58)
ECHO LA VOLUME AREA LENGTH: 124 ML
ECHO LA VOLUME INDEX A-L A2C: 62 ML/M2 (ref 16–34)
ECHO LA VOLUME INDEX A-L A4C: 61 ML/M2 (ref 16–34)
ECHO LA VOLUME INDEX AREA LENGTH: 63 ML/M2 (ref 16–34)
ECHO LA VOLUME INDEX MOD A2C: 58 ML/M2 (ref 16–34)
ECHO LA VOLUME INDEX MOD A4C: 58 ML/M2 (ref 16–34)
ECHO LV EF PHYSICIAN: 55 %
ECHO LV FRACTIONAL SHORTENING: 32 % (ref 28–44)
ECHO LV INTERNAL DIMENSION DIASTOLE INDEX: 1.72 CM/M2
ECHO LV INTERNAL DIMENSION DIASTOLIC: 3.4 CM (ref 4.2–5.9)
ECHO LV INTERNAL DIMENSION SYSTOLIC INDEX: 1.16 CM/M2
ECHO LV INTERNAL DIMENSION SYSTOLIC: 2.3 CM
ECHO LV ISOVOLUMETRIC RELAXATION TIME (IVRT): 96.9 MS
ECHO LV IVSD: 2 CM (ref 0.6–1)
ECHO LV MASS 2D: 265.7 G (ref 88–224)
ECHO LV MASS INDEX 2D: 134.2 G/M2 (ref 49–115)
ECHO LV POSTERIOR WALL DIASTOLIC: 1.7 CM (ref 0.6–1)
ECHO LV RELATIVE WALL THICKNESS RATIO: 1
ECHO LVOT AREA: 3.1 CM2
ECHO LVOT AV VTI INDEX: 0.24
ECHO LVOT DIAM: 2 CM
ECHO LVOT MEAN GRADIENT: 2 MMHG
ECHO LVOT PEAK GRADIENT: 3 MMHG
ECHO LVOT PEAK VELOCITY: 0.9 M/S
ECHO LVOT STROKE VOLUME INDEX: 42.3 ML/M2
ECHO LVOT SV: 83.8 ML
ECHO LVOT VTI: 26.7 CM
ECHO MV "A" WAVE DURATION: 175.3 MSEC
ECHO MV A VELOCITY: 0.96 M/S
ECHO MV AREA PHT: 2.3 CM2
ECHO MV AREA VTI: 2.3 CM2
ECHO MV E DECELERATION TIME (DT): 248.2 MS
ECHO MV E VELOCITY: 1.01 M/S
ECHO MV E/A RATIO: 1.05
ECHO MV LVOT VTI INDEX: 1.38
ECHO MV MAX VELOCITY: 1.1 M/S
ECHO MV MEAN GRADIENT: 2 MMHG
ECHO MV MEAN VELOCITY: 0.6 M/S
ECHO MV PEAK GRADIENT: 5 MMHG
ECHO MV PRESSURE HALF TIME (PHT): 94.9 MS
ECHO MV VTI: 36.8 CM
ECHO PULMONARY ARTERY SYSTOLIC PRESSURE (PASP): 41 MMHG
ECHO PV MAX VELOCITY: 0.8 M/S
ECHO PV MEAN GRADIENT: 2 MMHG
ECHO PV MEAN VELOCITY: 0.6 M/S
ECHO PV PEAK GRADIENT: 3 MMHG
ECHO PV VTI: 19.8 CM
ECHO PVEIN A DURATION: 133.8 MS
ECHO PVEIN A VELOCITY: 0.2 M/S
ECHO PVEIN PEAK D VELOCITY: 0.4 M/S
ECHO PVEIN PEAK S VELOCITY: 0.5 M/S
ECHO PVEIN S/D RATIO: 1.3
ECHO RIGHT VENTRICULAR SYSTOLIC PRESSURE (RVSP): 41 MMHG
ECHO RV INTERNAL DIMENSION: 4.1 CM
ECHO TV REGURGITANT MAX VELOCITY: 3.09 M/S
ECHO TV REGURGITANT PEAK GRADIENT: 38 MMHG

## 2024-12-10 PROCEDURE — 93306 TTE W/DOPPLER COMPLETE: CPT | Performed by: INTERNAL MEDICINE

## 2024-12-10 PROCEDURE — 2580000003 HC RX 258: Performed by: INTERNAL MEDICINE

## 2024-12-10 PROCEDURE — 6360000004 HC RX CONTRAST MEDICATION: Performed by: INTERNAL MEDICINE

## 2024-12-10 PROCEDURE — C8929 TTE W OR WO FOL WCON,DOPPLER: HCPCS

## 2024-12-10 RX ORDER — SODIUM CHLORIDE 0.9 % (FLUSH) 0.9 %
10 SYRINGE (ML) INJECTION PRN
Status: DISCONTINUED | OUTPATIENT
Start: 2024-12-10 | End: 2024-12-13 | Stop reason: HOSPADM

## 2024-12-10 RX ORDER — SODIUM CHLORIDE 0.9 % (FLUSH) 0.9 %
10 SYRINGE (ML) INJECTION PRN
Status: CANCELLED | OUTPATIENT
Start: 2024-12-10

## 2024-12-10 RX ADMIN — SODIUM CHLORIDE, PRESERVATIVE FREE 10 ML: 5 INJECTION INTRAVENOUS at 07:43

## 2024-12-10 RX ADMIN — PERFLUTREN 1.5 ML: 6.52 INJECTION, SUSPENSION INTRAVENOUS at 07:43

## 2024-12-12 ENCOUNTER — TELEPHONE (OUTPATIENT)
Dept: CARDIOLOGY CLINIC | Age: 89
End: 2024-12-12

## 2024-12-12 DIAGNOSIS — I35.2 NONRHEUMATIC AORTIC (VALVE) STENOSIS WITH INSUFFICIENCY: Primary | ICD-10-CM

## 2024-12-12 NOTE — TELEPHONE ENCOUNTER
----- Message from Dr. Ramo Mai MD sent at 12/12/2024  9:33 AM EST -----  He now has severe aortic stenosis. Lets refer him to Dr. Mancini for TAVR consideration. I can talk to him if he has questions.

## 2024-12-12 NOTE — TELEPHONE ENCOUNTER
Spoke to the patient information given per Dr. Mai     Message sent referral placed to valve clinic to schedule

## 2024-12-19 ENCOUNTER — OFFICE VISIT (OUTPATIENT)
Dept: CARDIOLOGY CLINIC | Age: 89
End: 2024-12-19
Payer: MEDICARE

## 2024-12-19 ENCOUNTER — OFFICE VISIT (OUTPATIENT)
Dept: CARDIOTHORACIC SURGERY | Age: 89
End: 2024-12-19

## 2024-12-19 VITALS
DIASTOLIC BLOOD PRESSURE: 70 MMHG | HEIGHT: 69 IN | BODY MASS INDEX: 26.66 KG/M2 | HEART RATE: 58 BPM | RESPIRATION RATE: 22 BRPM | SYSTOLIC BLOOD PRESSURE: 158 MMHG | WEIGHT: 180 LBS

## 2024-12-19 DIAGNOSIS — I35.0 NONRHEUMATIC AORTIC VALVE STENOSIS: Primary | ICD-10-CM

## 2024-12-19 PROCEDURE — G8484 FLU IMMUNIZE NO ADMIN: HCPCS | Performed by: INTERNAL MEDICINE

## 2024-12-19 PROCEDURE — 1159F MED LIST DOCD IN RCRD: CPT | Performed by: INTERNAL MEDICINE

## 2024-12-19 PROCEDURE — 99205 OFFICE O/P NEW HI 60 MIN: CPT | Performed by: INTERNAL MEDICINE

## 2024-12-19 PROCEDURE — G8417 CALC BMI ABV UP PARAM F/U: HCPCS | Performed by: INTERNAL MEDICINE

## 2024-12-19 PROCEDURE — G8427 DOCREV CUR MEDS BY ELIG CLIN: HCPCS | Performed by: INTERNAL MEDICINE

## 2024-12-19 PROCEDURE — 1036F TOBACCO NON-USER: CPT | Performed by: INTERNAL MEDICINE

## 2024-12-19 PROCEDURE — 1123F ACP DISCUSS/DSCN MKR DOCD: CPT | Performed by: INTERNAL MEDICINE

## 2024-12-19 ASSESSMENT — ENCOUNTER SYMPTOMS
BACK PAIN: 0
COUGH: 0
VOMITING: 0
BLOOD IN STOOL: 0
SHORTNESS OF BREATH: 1
NAUSEA: 0
ABDOMINAL PAIN: 0
CHEST TIGHTNESS: 0

## 2024-12-19 NOTE — PROGRESS NOTES
status:      Spouse name: Not on file    Number of children: Not on file    Years of education: Not on file    Highest education level: Not on file   Occupational History    Not on file   Tobacco Use    Smoking status: Former     Current packs/day: 0.00     Average packs/day: 1 pack/day for 10.0 years (10.0 ttl pk-yrs)     Types: Cigarettes     Start date: 1954     Quit date: 1964     Years since quittin.0    Smokeless tobacco: Never   Vaping Use    Vaping status: Never Used   Substance and Sexual Activity    Alcohol use: Not Currently     Comment: none since     Drug use: Never    Sexual activity: Not on file   Other Topics Concern    Not on file   Social History Narrative    Drinks 2-3 cups of tea daily.      Social Determinants of Health     Financial Resource Strain: Not on file   Food Insecurity: Not on file   Transportation Needs: Not on file   Physical Activity: Not on file   Stress: Not on file   Social Connections: Not on file   Intimate Partner Violence: Not on file   Housing Stability: Not on file      Allergies   Allergen Reactions    Bee Venom     Iodine Other (See Comments)    Shellfish-Derived Products     Sulfa Antibiotics            Current Outpatient Medications:     ASPIRIN LOW DOSE 81 MG chewable tablet, TAKE 1 TABLET BY MOUTH EVERY DAY, Disp: 90 tablet, Rfl: 3    atorvastatin (LIPITOR) 80 MG tablet, TAKE 1 TABLET BY MOUTH EVERY DAY, Disp: 90 tablet, Rfl: 1    amLODIPine (NORVASC) 5 MG tablet, TAKE 1 TABLET BY MOUTH EVERY DAY, Disp: 90 tablet, Rfl: 3    dapagliflozin (FARXIGA) 5 MG tablet, Take 1 tablet by mouth every morning, Disp: 90 tablet, Rfl: 1    metoprolol tartrate (LOPRESSOR) 50 MG tablet, TAKE 1 TABLET BY MOUTH TWICE A DAY, Disp: 180 tablet, Rfl: 3    niacin (SLO-NIACIN) 500 MG extended release tablet, Take 1 tablet by mouth nightly, Disp: , Rfl:     pravastatin (PRAVACHOL) 40 MG tablet, Take 1 tablet by mouth daily, Disp: , Rfl:     fenofibrate (TRICOR) 54 MG

## 2024-12-19 NOTE — PROGRESS NOTES
[facility]  Visit Note      Patient name: Júnior Serrano Jr    Reason for consult: AS    Referring Physician: Dr. Mai    Primary Care Physician: Reji Trejo DO    Date of service: 12/19/2024    Chief Complaint: AS    HPI: Júnior Serrano Jr is a 89 y.o. male referred by Dr. Mai for AS. Past medical history of CAD s/p failed PCI with emergent CABG x2 (Debbie, 1998), LIMA-LAD, SVG-RCA, sternal malunion requiring intervention (2000), HTN, HLD, DM, TIA, CKD, CARLOS, former smoker and aortic stenosis    He saw Dr. Mai in October where he noted LACKEY but no other cardiac complaints. He had an echo for surveillance of AS, and it showed severe disease. He presents today for evaluation.    TTE 12/10/24:    Left Ventricle: Normal left ventricular systolic function with a visually estimated EF of 55 - 60%. Left ventricle size is normal. Findings consistent with moderate concentric hypertrophy. Normal wall motion. Grade II diastolic dysfunction with increased LAP.    Right Ventricle: Right ventricle size is normal. Low normal systolic function.    Aortic Valve: Moderately calcified cusps. Mild regurgitation. Severe stenosis of the aortic valve. AV mean gradient is 41 mmHg. AV area by continuity VTI is 0.8 cm2. AV Stroke Volume index is 42.3 mL/m2.    Tricuspid Valve: Mild regurgitation. Mildly elevated RVSP, consistent with mild pulmonary hypertension. Est RA pressure is 3 mmHg. The estimated PASP is 41 mmHg.    Left Atrium: Left atrium is severely dilated.    Right Atrium: Right atrium is moderately dilated.    Aorta: Mildly dilated aortic root. Mildly dilated ascending aorta.    Compared to the prior study from 2021, the gradients across the aortic valve have increased significantly.    Image quality is suboptimal. Contrast used: Definity.    Allergies:   Allergies   Allergen Reactions    Bee Venom     Iodine Other (See Comments)    Shellfish-Derived Products     Sulfa Antibiotics        Home

## 2024-12-20 ENCOUNTER — TELEPHONE (OUTPATIENT)
Dept: CARDIOLOGY CLINIC | Age: 89
End: 2024-12-20

## 2024-12-20 DIAGNOSIS — Z88.8 ALLERGY TO IODINE: Primary | ICD-10-CM

## 2024-12-20 DIAGNOSIS — Z01.810 PREOP CARDIOVASCULAR EXAM: ICD-10-CM

## 2024-12-20 DIAGNOSIS — I35.0 NONRHEUMATIC AORTIC VALVE STENOSIS: Primary | ICD-10-CM

## 2024-12-20 RX ORDER — PREDNISONE 50 MG/1
TABLET ORAL
Qty: 3 TABLET | Refills: 0 | Status: SHIPPED | OUTPATIENT
Start: 2025-01-05

## 2024-12-20 NOTE — TELEPHONE ENCOUNTER
HEART CATH INSTRUCTIONS    I called Júnior & went over his heart cath instructions  Place: [facility]  Date & Time: 1/6/24 at 11:30 am  Arrival Time: 9:30 am  Preop Labs: Get your preop lab work a few days before the cath at a [facility]   NPO post midnight the night before cath  Take the following meds am of procedure: Amlodipine, Metoprolol, Inhalers if needed  Oral Hypoglycemic orders:  Hold Farxiga am of cath  Take ASA 81 mg 4 tabs prior to coming to the hosp  Metformin: Take your last dose on  1/5 & hold it for 2 days after the cath & resume it on 1/9  Prednisone 50 mg at 10:30 pm on 1/5, 4:30 am on 1/6, & 10:30 am on 1/6  Benadryl 50 mg at 10:30 am on 1/6  Bring last dose of Prednisone & Benadryl with you to [facility]  Prednisone e-scribed to [facility] in [place]  Instructed to purchase the Benadryl OTC  Must have a ride home after the procedure  More detailed instructions mailed to pt, emailed to [email or web address], and will be scanned into [employer]

## 2024-12-23 DIAGNOSIS — I35.0 NONRHEUMATIC AORTIC VALVE STENOSIS: Primary | ICD-10-CM

## 2025-01-02 ENCOUNTER — HOSPITAL ENCOUNTER (OUTPATIENT)
Age: 89
Discharge: HOME | End: 2025-01-02
Payer: MEDICARE

## 2025-01-02 DIAGNOSIS — Z01.810 PREOP CARDIOVASCULAR EXAM: ICD-10-CM

## 2025-01-02 DIAGNOSIS — I35.0 NONRHEUMATIC AORTIC VALVE STENOSIS: ICD-10-CM

## 2025-01-02 LAB
ANION GAP SERPL CALCULATED.3IONS-SCNC: 11 MMOL/L (ref 7–16)
BASOPHILS # BLD: 0.07 K/UL (ref 0–0.2)
BASOPHILS NFR BLD: 1 % (ref 0–2)
BUN SERPL-MCNC: 22 MG/DL (ref 6–23)
CALCIUM SERPL-MCNC: 10 MG/DL (ref 8.6–10.2)
CHLORIDE SERPL-SCNC: 106 MMOL/L (ref 98–107)
CO2 SERPL-SCNC: 26 MMOL/L (ref 22–29)
CREAT SERPL-MCNC: 1.7 MG/DL (ref 0.7–1.2)
EOSINOPHIL # BLD: 0.3 K/UL (ref 0.05–0.5)
EOSINOPHILS RELATIVE PERCENT: 5 % (ref 0–6)
ERYTHROCYTE [DISTWIDTH] IN BLOOD BY AUTOMATED COUNT: 13.7 % (ref 11.5–15)
GFR, ESTIMATED: 38 ML/MIN/1.73M2
GLUCOSE SERPL-MCNC: 115 MG/DL (ref 74–99)
HCT VFR BLD AUTO: 37 % (ref 37–54)
HGB BLD-MCNC: 12.4 G/DL (ref 12.5–16.5)
IMM GRANULOCYTES # BLD AUTO: <0.03 K/UL (ref 0–0.58)
IMM GRANULOCYTES NFR BLD: 0 % (ref 0–5)
INR PPP: 1.1
LYMPHOCYTES NFR BLD: 1.89 K/UL (ref 1.5–4)
LYMPHOCYTES RELATIVE PERCENT: 30 % (ref 20–42)
MCH RBC QN AUTO: 32.7 PG (ref 26–35)
MCHC RBC AUTO-ENTMCNC: 33.5 G/DL (ref 32–34.5)
MCV RBC AUTO: 97.6 FL (ref 80–99.9)
MONOCYTES NFR BLD: 0.7 K/UL (ref 0.1–0.95)
MONOCYTES NFR BLD: 11 % (ref 2–12)
NEUTROPHILS NFR BLD: 53 % (ref 43–80)
NEUTS SEG NFR BLD: 3.38 K/UL (ref 1.8–7.3)
PLATELET # BLD AUTO: 190 K/UL (ref 130–450)
PMV BLD AUTO: 10.2 FL (ref 7–12)
POTASSIUM SERPL-SCNC: 5 MMOL/L (ref 3.5–5)
PROTHROMBIN TIME: 12.1 SEC (ref 9.3–12.4)
RBC # BLD AUTO: 3.79 M/UL (ref 3.8–5.8)
SODIUM SERPL-SCNC: 143 MMOL/L (ref 132–146)
WBC OTHER # BLD: 6.4 K/UL (ref 4.5–11.5)

## 2025-01-02 PROCEDURE — 85025 COMPLETE CBC W/AUTO DIFF WBC: CPT

## 2025-01-02 PROCEDURE — 80048 BASIC METABOLIC PNL TOTAL CA: CPT

## 2025-01-02 PROCEDURE — 36415 COLL VENOUS BLD VENIPUNCTURE: CPT

## 2025-01-02 PROCEDURE — 85610 PROTHROMBIN TIME: CPT
